# Patient Record
Sex: FEMALE | Race: WHITE | NOT HISPANIC OR LATINO | Employment: OTHER | URBAN - METROPOLITAN AREA
[De-identification: names, ages, dates, MRNs, and addresses within clinical notes are randomized per-mention and may not be internally consistent; named-entity substitution may affect disease eponyms.]

---

## 2017-06-15 DIAGNOSIS — Z12.31 SCREENING MAMMOGRAM FOR HIGH-RISK PATIENT: Primary | ICD-10-CM

## 2017-06-29 ENCOUNTER — HOSPITAL ENCOUNTER (OUTPATIENT)
Dept: RADIOLOGY | Facility: HOSPITAL | Age: 72
Discharge: HOME | End: 2017-06-29
Attending: SPECIALIST
Payer: MEDICARE

## 2017-06-29 VITALS — HEIGHT: 64 IN | BODY MASS INDEX: 31.76 KG/M2 | WEIGHT: 186 LBS

## 2017-06-29 DIAGNOSIS — Z12.31 SCREENING MAMMOGRAM FOR HIGH-RISK PATIENT: ICD-10-CM

## 2017-06-29 PROCEDURE — 77067 SCR MAMMO BI INCL CAD: CPT | Mod: 26,,, | Performed by: RADIOLOGY

## 2017-06-29 PROCEDURE — 77067 SCR MAMMO BI INCL CAD: CPT | Mod: TC

## 2017-06-29 PROCEDURE — 77063 BREAST TOMOSYNTHESIS BI: CPT | Mod: 26,,, | Performed by: RADIOLOGY

## 2018-05-15 ENCOUNTER — TELEPHONE (OUTPATIENT)
Dept: ORTHOPEDICS | Facility: CLINIC | Age: 73
End: 2018-05-15

## 2018-05-15 ENCOUNTER — OFFICE VISIT (OUTPATIENT)
Dept: ORTHOPEDICS | Facility: CLINIC | Age: 73
End: 2018-05-15
Payer: MEDICARE

## 2018-05-15 VITALS
WEIGHT: 193 LBS | DIASTOLIC BLOOD PRESSURE: 90 MMHG | BODY MASS INDEX: 32.95 KG/M2 | HEIGHT: 64 IN | SYSTOLIC BLOOD PRESSURE: 140 MMHG | HEART RATE: 80 BPM

## 2018-05-15 DIAGNOSIS — M25.332 SCAPHO-LUNATE DISSOCIATION, LEFT: ICD-10-CM

## 2018-05-15 DIAGNOSIS — R93.89 ABNORMAL X-RAY: ICD-10-CM

## 2018-05-15 DIAGNOSIS — M75.41 IMPINGEMENT SYNDROME OF RIGHT SHOULDER: Primary | ICD-10-CM

## 2018-05-15 DIAGNOSIS — M75.100 TEAR OF ROTATOR CUFF, UNSPECIFIED LATERALITY, UNSPECIFIED TEAR EXTENT: ICD-10-CM

## 2018-05-15 PROCEDURE — 20610 DRAIN/INJ JOINT/BURSA W/O US: CPT | Mod: RT,,, | Performed by: ORTHOPAEDIC SURGERY

## 2018-05-15 PROCEDURE — 99214 OFFICE O/P EST MOD 30 MIN: CPT | Mod: 25,,, | Performed by: ORTHOPAEDIC SURGERY

## 2018-05-15 RX ORDER — METHYLPREDNISOLONE ACETATE 40 MG/ML
40 INJECTION, SUSPENSION INTRA-ARTICULAR; INTRALESIONAL; INTRAMUSCULAR; SOFT TISSUE
Status: DISCONTINUED | OUTPATIENT
Start: 2018-05-15 | End: 2018-05-15 | Stop reason: HOSPADM

## 2018-05-15 RX ORDER — PANTOPRAZOLE SODIUM 40 MG/1
40 TABLET, DELAYED RELEASE ORAL DAILY
COMMUNITY
End: 2019-11-13 | Stop reason: SDUPTHER

## 2018-05-15 RX ORDER — LOSARTAN POTASSIUM 25 MG/1
25 TABLET ORAL DAILY
COMMUNITY
End: 2021-02-23 | Stop reason: SDUPTHER

## 2018-05-15 RX ADMIN — METHYLPREDNISOLONE ACETATE 40 MG: 40 INJECTION, SUSPENSION INTRA-ARTICULAR; INTRALESIONAL; INTRAMUSCULAR; SOFT TISSUE at 11:05

## 2018-05-15 NOTE — TELEPHONE ENCOUNTER
----- Message from Peng Hurst sent at 5/14/2018  8:54 AM CDT -----  Please call patient she's having issues with her arm, I scheduled her to come in to see finger for the 22nd of May, she just wants to know what can she do for the pain until then. [phone]

## 2018-05-15 NOTE — PROCEDURES
Large Joint Aspiration/Injection  Date/Time: 5/15/2018 11:45 AM  Performed by: PAULINO ANDERSON  Authorized by: PAULINO ANDERSON     Consent Done?:  Yes (Verbal)  Indications:  Pain  Procedure site marked: Yes    Timeout: Prior to procedure the correct patient, procedure, and site was verified      Location:  Shoulder  Site:  R subacromial bursa  Prep: Patient was prepped and draped in usual sterile fashion    Needle size:  25 G  Medications:  40 mg methylPREDNISolone acetate 40 mg/mL; 40 mg methylPREDNISolone acetate 40 mg/mL  Patient tolerance:  Patient tolerated the procedure well with no immediate complications

## 2018-05-15 NOTE — PROGRESS NOTES
[facility]    Subjective:     Chief Complaint:   Chief Complaint   Patient presents with    Right Shoulder - Pain     Right shoulder pain since a fall at home Thusday. X rays done at [facility]    Right Hip - Pain     Right hip pain from  Fall at home    Neck - Pain     Neck pain since fall at home       Past Medical History:   Diagnosis Date    Allergy     Anemia     Hyperlipidemia        Past Surgical History:   Procedure Laterality Date    HIP ARTHROPLASTY      HYSTERECTOMY      35 years old    TOTAL REDUCTION MAMMOPLASTY Bilateral     2000       Current Outpatient Prescriptions   Medication Sig    aspirin (ENTERIC COATED ASPIRIN) 81 MG EC tablet Every day    calcium carbonate-vit D3-min (CALTRATE 600+D PLUS MINERALS) 600 mg calcium- 400 unit Tab Twice a day    carvedilol (COREG) 6.25 MG tablet Take 6.25 mg by mouth 2 (two) times daily with meals.      coenzyme Q10 (CO Q-10) 100 mg capsule Take 100 mg by mouth once daily.      fexofenadine (ALLEGRA) 180 MG tablet Take 180 mg by mouth once daily.      losartan (COZAAR) 25 MG tablet Take 25 mg by mouth once daily.    montelukast (SINGULAIR) 10 mg tablet Every day    multivitamin-minerals-lutein (CENTRUM SILVER) Tab Every day    omega-3 acid ethyl esters (LOVAZA) 1 gram capsule Take 1 g by mouth 3 (three) times daily. Every day    pantoprazole (PROTONIX) 40 MG tablet Take 40 mg by mouth once daily.    VOLTAREN 1 % Gel USE 3 TIMES A DAY    esomeprazole (NEXIUM) 40 MG capsule Take 40 mg by mouth before breakfast.      olmesartan (BENICAR) 20 MG tablet Every day    tramadol (ULTRAM) 50 mg tablet Three times a day     No current facility-administered medications for this visit.        Review of patient's allergies indicates:   Allergen Reactions    Latex, natural rubber     Sunflower seed     Latex Rash    Naprosyn [naproxen] Rash       Family History   Problem Relation Age of Onset    Stroke Mother     Heart disease Mother     Heart  disease Father     Breast cancer Maternal Aunt        Social History     Social History    Marital status:      Spouse name: N/A    Number of children: N/A    Years of education: N/A     Occupational History    Not on file.     Social History Main Topics    Smoking status: Never Smoker    Smokeless tobacco: Not on file    Alcohol use Not on file    Drug use: No    Sexual activity: Yes     Partners: Male     Other Topics Concern    Not on file     Social History Narrative    No narrative on file       History of present illness: Patient comes in today with a chief complaint of right shoulder pain. She also has hip pain. Her biggest problem is her shoulder. She had a fall. She was seen in the emergency room.      Review of Systems:    Constitution: Negative for chills, fever, and sweats.  Negative for unexplained weight loss.    HENT:  Negative for headaches and blurry vision.    Cardiovascular:Negative for chest pain or irregular heart beat. Negative for hypertension.    Respiratory:  Negative for cough and shortness of breath.    Gastrointestinal: Negative for abdominal pain, heartburn, melena, nausea, and vomitting.    Genitourinary:  Negative bladder incontinence and dysuria.    Musculoskeletal:  See HPI for details.     Neurological: Negative for numbness.    Psychiatric/Behavioral: Negative for depression.  The patient is not nervous/anxious.      Endocrine: Negative for polyuria    Hematologic/Lymphatic: Negative for bleeding problem.  Does not bruise/bleed easily.    Skin: Negative for poor would healing and rash    Objective:      Physical Examination:    Vital Signs:    Vitals:    05/15/18 1042   BP: (!) 140/90   Pulse: 80       Body mass index is 33.13 kg/m².    This a well-developed, well nourished patient in no acute distress.  They are alert and oriented and cooperative to examination.        Patient has full range of motion of the right shoulder. Her rotator cuff is profoundly weak.  Spurling sign is negative. Full range of motion of the left shoulder. Full range of motion of the cervical spine. She has no pain with internal and external rotation of her right hip. She has a contusion right hip  Pertinent New Results:    XRAY Report / Interpretation:   AP and lateral of the right shoulder demonstrates a type II acromion no fractures or subluxations  AP of the pelvis demonstrates no fractures or subluxations. She is noted to have right retained anchor is in the trochanteric region. No osteoarthritis    Assessment/Plan:      Right shoulder rotator cuff tear. I injected with Depo-Medrol and lidocaine. She had some relief of her pain but she remained profoundly weak. I've ordered an MRI scan. In terms of her hip she has a right hip contusion. No intervention. She will follow-up with her MRI      This note was created using Dragon voice recognition software that occasionally misinterpreted phrases or words.

## 2018-05-24 ENCOUNTER — OFFICE VISIT (OUTPATIENT)
Dept: ORTHOPEDICS | Facility: CLINIC | Age: 73
End: 2018-05-24
Payer: MEDICARE

## 2018-05-24 VITALS
HEIGHT: 64 IN | SYSTOLIC BLOOD PRESSURE: 138 MMHG | DIASTOLIC BLOOD PRESSURE: 80 MMHG | BODY MASS INDEX: 32.61 KG/M2 | WEIGHT: 191 LBS

## 2018-05-24 DIAGNOSIS — M75.111 INCOMPLETE TEAR OF RIGHT ROTATOR CUFF: Primary | ICD-10-CM

## 2018-05-24 DIAGNOSIS — S43.431A SUPERIOR GLENOID LABRUM LESION OF RIGHT SHOULDER, INITIAL ENCOUNTER: ICD-10-CM

## 2018-05-24 DIAGNOSIS — M75.41 IMPINGEMENT SYNDROME OF RIGHT SHOULDER: ICD-10-CM

## 2018-05-24 PROCEDURE — 99213 OFFICE O/P EST LOW 20 MIN: CPT | Mod: ,,, | Performed by: ORTHOPAEDIC SURGERY

## 2018-05-24 NOTE — PROGRESS NOTES
[facility]    Subjective:     Chief Complaint:   Chief Complaint   Patient presents with    Right Shoulder - Injury     Right shoulder MRI results       Past Medical History:   Diagnosis Date    Allergy     Anemia     Hyperlipidemia        Past Surgical History:   Procedure Laterality Date    HIP ARTHROPLASTY      HYSTERECTOMY      35 years old    TOTAL REDUCTION MAMMOPLASTY Bilateral     2000       Current Outpatient Prescriptions   Medication Sig    aspirin (ENTERIC COATED ASPIRIN) 81 MG EC tablet Every day    calcium carbonate-vit D3-min (CALTRATE 600+D PLUS MINERALS) 600 mg calcium- 400 unit Tab Twice a day    carvedilol (COREG) 6.25 MG tablet Take 6.25 mg by mouth 2 (two) times daily with meals.      coenzyme Q10 (CO Q-10) 100 mg capsule Take 100 mg by mouth once daily.      losartan (COZAAR) 25 MG tablet Take 25 mg by mouth once daily.    montelukast (SINGULAIR) 10 mg tablet Every day    multivitamin-minerals-lutein (CENTRUM SILVER) Tab Every day    pantoprazole (PROTONIX) 40 MG tablet Take 40 mg by mouth once daily.     No current facility-administered medications for this visit.        Review of patient's allergies indicates:   Allergen Reactions    Latex, natural rubber     Sunflower seed     Latex Rash    Naprosyn [naproxen] Rash       Family History   Problem Relation Age of Onset    Stroke Mother     Heart disease Mother     Heart disease Father     Breast cancer Maternal Aunt        Social History     Social History    Marital status:      Spouse name: N/A    Number of children: N/A    Years of education: N/A     Occupational History    Not on file.     Social History Main Topics    Smoking status: Never Smoker    Smokeless tobacco: Not on file    Alcohol use Not on file    Drug use: No    Sexual activity: Yes     Partners: Male     Other Topics Concern    Not on file     Social History Narrative    No narrative on file       History of present  illness: Patient returns for continued evaluation of the right shoulder. She continues complain of significant shoulder pain and weakness.      Review of Systems:    Constitution: Negative for chills, fever, and sweats.  Negative for unexplained weight loss.    HENT:  Negative for headaches and blurry vision.    Cardiovascular:Negative for chest pain or irregular heart beat. Negative for hypertension.    Respiratory:  Negative for cough and shortness of breath.    Gastrointestinal: Negative for abdominal pain, heartburn, melena, nausea, and vomitting.    Genitourinary:  Negative bladder incontinence and dysuria.    Musculoskeletal:  See HPI for details.     Neurological: Negative for numbness.    Psychiatric/Behavioral: Negative for depression.  The patient is not nervous/anxious.      Endocrine: Negative for polyuria    Hematologic/Lymphatic: Negative for bleeding problem.  Does not bruise/bleed easily.    Skin: Negative for poor would healing and rash    Objective:      Physical Examination:    Vital Signs:    Vitals:    05/24/18 1432   BP: 138/80       Body mass index is 32.79 kg/m².    This a well-developed, well nourished patient in no acute distress.  They are alert and oriented and cooperative to examination.        Patient has full range of motion of the shoulder passively. Her rotator cuff is profoundly weak. Spurling sign is negative. Full range of motion of the left shoulder  Pertinent New Results:    XRAY Report / Interpretation:   MRI done at [facility] demonstrates partial-thickness tearing of the rotator cuff, SLAP tear, subacromial impingement.    Assessment/Plan:      Impingement syndrome partial-thickness tearing of the rotator cuff. I've started her on physical therapy. I will check her back in a month      This note was created using Dragon voice recognition software that occasionally misinterpreted phrases or words.

## 2018-06-08 ENCOUNTER — OFFICE VISIT (OUTPATIENT)
Dept: ORTHOPEDICS | Facility: CLINIC | Age: 73
End: 2018-06-08
Payer: MEDICARE

## 2018-06-08 VITALS
BODY MASS INDEX: 32.61 KG/M2 | HEIGHT: 64 IN | HEART RATE: 79 BPM | WEIGHT: 191 LBS | DIASTOLIC BLOOD PRESSURE: 76 MMHG | SYSTOLIC BLOOD PRESSURE: 138 MMHG

## 2018-06-08 DIAGNOSIS — Z98.1 HISTORY OF FUSION OF CERVICAL SPINE: ICD-10-CM

## 2018-06-08 DIAGNOSIS — M70.62 GREATER TROCHANTERIC BURSITIS OF LEFT HIP: Primary | ICD-10-CM

## 2018-06-08 DIAGNOSIS — M54.50 LOW BACK PAIN, NON-SPECIFIC: ICD-10-CM

## 2018-06-08 DIAGNOSIS — M25.552 LEFT HIP PAIN: ICD-10-CM

## 2018-06-08 DIAGNOSIS — M54.12 CERVICAL RADICULITIS: ICD-10-CM

## 2018-06-08 DIAGNOSIS — M43.16 SPONDYLOLISTHESIS OF LUMBAR REGION: ICD-10-CM

## 2018-06-08 PROCEDURE — 99213 OFFICE O/P EST LOW 20 MIN: CPT | Mod: 25,,, | Performed by: PHYSICIAN ASSISTANT

## 2018-06-08 PROCEDURE — 72100 X-RAY EXAM L-S SPINE 2/3 VWS: CPT | Mod: ,,, | Performed by: PHYSICIAN ASSISTANT

## 2018-06-08 PROCEDURE — 73502 X-RAY EXAM HIP UNI 2-3 VIEWS: CPT | Mod: LT,,, | Performed by: PHYSICIAN ASSISTANT

## 2018-06-08 PROCEDURE — 20610 DRAIN/INJ JOINT/BURSA W/O US: CPT | Mod: LT,,, | Performed by: PHYSICIAN ASSISTANT

## 2018-06-08 RX ORDER — METHYLPREDNISOLONE ACETATE 40 MG/ML
40 INJECTION, SUSPENSION INTRA-ARTICULAR; INTRALESIONAL; INTRAMUSCULAR; SOFT TISSUE
Status: DISCONTINUED | OUTPATIENT
Start: 2018-06-08 | End: 2018-06-08 | Stop reason: HOSPADM

## 2018-06-08 RX ADMIN — METHYLPREDNISOLONE ACETATE 40 MG: 40 INJECTION, SUSPENSION INTRA-ARTICULAR; INTRALESIONAL; INTRAMUSCULAR; SOFT TISSUE at 12:06

## 2018-06-08 NOTE — PROCEDURES
Large Joint Aspiration/Injection  Date/Time: 6/8/2018 12:45 PM  Performed by: MYKEL BETTENCOURT  Authorized by: MYKEL BETTENCOURT     Consent Done?:  Yes (Verbal)  Indications:  Pain  Procedure site marked: Yes    Timeout: Prior to procedure the correct patient, procedure, and site was verified      Location:  Hip  Site:  L greater trochanteric bursa  Prep: Patient was prepped and draped in usual sterile fashion    Ultrasonic Guidance for needle placement: No  Needle size:  22 G  Approach:  Lateral  Medications:  40 mg methylPREDNISolone acetate 40 mg/mL; 40 mg methylPREDNISolone acetate 40 mg/mL  Patient tolerance:  Patient tolerated the procedure well with no immediate complications

## 2018-06-08 NOTE — PROGRESS NOTES
Subjective:       Patient ID: Sarina Zavala is a 72 y.o. female.    Chief Complaint: Pain of the Lower Back (Lumbar pain x 2 months off and on. States pain does radiate down left side to hip region. ); Pain of the Left Hip (Left hip pain x 3 weeks. States pt did fall. States it does hurt to walk and that she is not able to bear weight correctly. She limps on her left side. Patient requests an injection to help with pain. ); and Pain of the Left Wrist (Left wrist pain x a few months. Pain starts in wrist and does radiate up. States that the wrist hurts when she moves in certain directions. )      History of Present Illness  Patient presents with multiple orthopedic complaints. Her chief complaint today is left lateral hip pain over the greater trochanter. Besides that she has some left wrist pain as well as some left lumbar pain with radiculitis. The fall landing on her right side and has had some issues on her right side as well. Dr. finger is following her for these issues. Dr. hutton has also recommended that she follow-up with Dr. Sood for the lumbar spine radicular symptoms. The patient has an appointment scheduled later this month. The patient has a past medical history of lumbar degenerative disc disease as well as cervical degenerative disc disease with a previous cervical fusion. She has been complaining of left upper extremity radicular symptoms as well.    Current Medications  Current Outpatient Prescriptions   Medication Sig Dispense Refill    aspirin (ENTERIC COATED ASPIRIN) 81 MG EC tablet Every day      calcium carbonate-vit D3-min (CALTRATE 600+D PLUS MINERALS) 600 mg calcium- 400 unit Tab Twice a day      carvedilol (COREG) 6.25 MG tablet Take 6.25 mg by mouth 2 (two) times daily with meals.        coenzyme Q10 (CO Q-10) 100 mg capsule Take 100 mg by mouth once daily.        losartan (COZAAR) 25 MG tablet Take 25 mg by mouth once daily.      montelukast (SINGULAIR) 10 mg tablet  Every day      multivitamin-minerals-lutein (CENTRUM SILVER) Tab Every day      pantoprazole (PROTONIX) 40 MG tablet Take 40 mg by mouth once daily.       No current facility-administered medications for this visit.        Allergies  Review of patient's allergies indicates:   Allergen Reactions    Latex, natural rubber     Sunflower seed     Latex Rash    Naprosyn [naproxen] Rash       Past Medical History  Past Medical History:   Diagnosis Date    Allergy     Anemia     Hyperlipidemia        Surgical History  Past Surgical History:   Procedure Laterality Date    HIP ARTHROPLASTY      HYSTERECTOMY      35 years old    TOTAL REDUCTION MAMMOPLASTY Bilateral     2000       Family History:   Family History   Problem Relation Age of Onset    Stroke Mother     Heart disease Mother     Heart disease Father     Breast cancer Maternal Aunt        Social History:   Social History     Social History    Marital status:      Spouse name: N/A    Number of children: N/A    Years of education: N/A     Occupational History    Not on file.     Social History Main Topics    Smoking status: Never Smoker    Smokeless tobacco: Not on file    Alcohol use Not on file    Drug use: No    Sexual activity: Yes     Partners: Male     Other Topics Concern    Not on file     Social History Narrative    No narrative on file       Hospitalization/Major Diagnostic Procedure:     Review of Systems     General/Constitutional:  Chills denies. Fatigue denies. Fever denies. Weight gain denies. Weight loss denies.    Respiratory:  Shortness of breath denies.    Cardiovascular:  Chest pain denies.    Gastrointestinal:  Constipation denies. Diarrhea denies. Nausea denies. Vomiting denies.     Hematology:  Easy bruising denies. Prolonged bleeding denies.     Genitourinary:  Frequent urination denies. Pain in lower back denies. Painful urination denies.     Musculoskeletal:  See HPI for details    Skin:  Rash  denies.    Neurologic:  Dizziness denies. Gait abnormalities denies. Seizures denies. Tingling/Numbess denies.    Psychiatric:  Anxiety denies. Depressed mood denies.     Objective:   Vital Signs:   Vitals:    06/08/18 1138   BP: 138/76   Pulse: 79        Physical Exam      General Examination:     Constitutional: The patient is alert and oriented to lace person and time. Mood is pleasant.     Head/Face: Normal facial features normal eyebrows    Eyes: Normal extraocular motion bilaterally    Lungs: Respirations are equal and unlabored    Gait is coordinated.    Cardiovascular: There are no swelling or varicosities present.    Lymphatic: Negative for adenopathy    Skin: Normal    Neurological: Level of consciousness normal. Oriented to place person and time and situation    Psychiatric: Oriented to time place person and situation    Left wrist was not examined.    Lumbar spine exam. Antalgic gait with decreased weightbearing on the left lower extremity. Exquisite tenderness palpation over the left hip greater trochanter. Lumbar range of motion diminished in all planes. Bilateral lower extremities are distal neurovascular intact. Cervical exam: Anterior cervical incision remains well-healed. Range of motion diminished in all planes. Diffuse tenderness palpation of the bilateral sternocleidomastoid and upper trapezius muscles. Left upper extremity exam demonstrates giveaway weakness secondary to pain. No true focal neurological weakness demonstrated.    XRAY Report/ Interpretation: Lumbar AP and lateral x-rays taken in the office today and reviewed the patient and her  demonstrates L2-3 grade 1 spondylolisthesis. Advanced multilevel lumbar degenerative disc disease and facet sclerosis. Left hip x-rays taken in the office today demonstrate no significant abnormality; AP and lateral x-rays were done of the hip.    Cervical x-rays and left wrist x-rays were not done today      Assessment:       1. Left hip pain     2. Low back pain, non-specific    3. Spondylolisthesis of lumbar region    4. Cervical radiculitis    5. History of fusion of cervical spine    6. Greater trochanteric bursitis of left hip        Plan:       Sarina was seen today for pain, pain and pain.    Diagnoses and all orders for this visit:    Left hip pain  -     X-Ray Hip 2 or 3 views Left    Low back pain, non-specific  -     X-Ray Lumbar Spine Ap And Lateral    Spondylolisthesis of lumbar region  -     MRI Lumbar Spine Without Contrast    Cervical radiculitis  -     MRI Cervical Spine Without Contrast    History of fusion of cervical spine  -     MRI Lumbar Spine Without Contrast    Greater trochanteric bursitis of left hip         No Follow-up on file.    Today she was given a left hip greater trochanter bursa injection using 2 cc lidocaine and 80 mg Depo-Medrol. Please see procedure report for details. I am going to order an updated cervical and lumbar MRI. The patient is to follow-up as scheduled in the next few weeks. At that time we will review the updated MRIs and discuss further treatment options for her cervical spine and her lumbar spine    This note was created using Dragon voice recognition software that occasionally misinterpreted phrases or words.

## 2018-06-28 ENCOUNTER — OFFICE VISIT (OUTPATIENT)
Dept: ORTHOPEDICS | Facility: CLINIC | Age: 73
End: 2018-06-28
Payer: MEDICARE

## 2018-06-28 VITALS
BODY MASS INDEX: 32.44 KG/M2 | WEIGHT: 190 LBS | HEIGHT: 64 IN | HEART RATE: 85 BPM | SYSTOLIC BLOOD PRESSURE: 125 MMHG | DIASTOLIC BLOOD PRESSURE: 65 MMHG

## 2018-06-28 DIAGNOSIS — M75.41 IMPINGEMENT SYNDROME OF RIGHT SHOULDER: ICD-10-CM

## 2018-06-28 DIAGNOSIS — M75.111 INCOMPLETE TEAR OF RIGHT ROTATOR CUFF: Primary | ICD-10-CM

## 2018-06-28 DIAGNOSIS — S43.431A SUPERIOR GLENOID LABRUM LESION OF RIGHT SHOULDER, INITIAL ENCOUNTER: ICD-10-CM

## 2018-06-28 PROCEDURE — 99213 OFFICE O/P EST LOW 20 MIN: CPT | Mod: ,,, | Performed by: ORTHOPAEDIC SURGERY

## 2018-06-28 RX ORDER — EZETIMIBE 10 MG/1
TABLET ORAL
Refills: 4 | COMMUNITY
Start: 2018-06-12 | End: 2018-10-30

## 2018-06-28 NOTE — PROGRESS NOTES
[facility]    Subjective:     Chief Complaint:   Chief Complaint   Patient presents with    Right Shoulder - Pain     Right shoulder PT follow up. States PT is helping greatly. States pain and ROM is much better. States her shoulder feels great and only a few motions aggravates it.        Past Medical History:   Diagnosis Date    Allergy     Anemia     Hyperlipidemia        Past Surgical History:   Procedure Laterality Date    HIP ARTHROPLASTY      HYSTERECTOMY      35 years old    TOTAL REDUCTION MAMMOPLASTY Bilateral     2000       Current Outpatient Prescriptions   Medication Sig    aspirin (ENTERIC COATED ASPIRIN) 81 MG EC tablet Every day    calcium carbonate-vit D3-min (CALTRATE 600+D PLUS MINERALS) 600 mg calcium- 400 unit Tab Twice a day    carvedilol (COREG) 6.25 MG tablet Take 6.25 mg by mouth 2 (two) times daily with meals.      coenzyme Q10 (CO Q-10) 100 mg capsule Take 100 mg by mouth once daily.      ezetimibe (ZETIA) 10 mg tablet TK 1 T PO QD    losartan (COZAAR) 25 MG tablet Take 25 mg by mouth once daily.    montelukast (SINGULAIR) 10 mg tablet Every day    multivitamin-minerals-lutein (CENTRUM SILVER) Tab Every day    pantoprazole (PROTONIX) 40 MG tablet Take 40 mg by mouth once daily.     No current facility-administered medications for this visit.        Review of patient's allergies indicates:   Allergen Reactions    Latex, natural rubber     Sunflower seed     Latex Rash    Naprosyn [naproxen] Rash       Family History   Problem Relation Age of Onset    Stroke Mother     Heart disease Mother     Heart disease Father     Breast cancer Maternal Aunt        Social History     Social History    Marital status:      Spouse name: N/A    Number of children: N/A    Years of education: N/A     Occupational History    Not on file.     Social History Main Topics    Smoking status: Never Smoker    Smokeless tobacco: Not on file    Alcohol use Not on file     Drug use: No    Sexual activity: Yes     Partners: Male     Other Topics Concern    Not on file     Social History Narrative    No narrative on file       History of present illness: Patient comes in today for her right shoulder. She is generally doing better. Having mild discomfort but much better than she was.      Review of Systems:    Constitution: Negative for chills, fever, and sweats.  Negative for unexplained weight loss.    HENT:  Negative for headaches and blurry vision.    Cardiovascular:Negative for chest pain or irregular heart beat. Negative for hypertension.    Respiratory:  Negative for cough and shortness of breath.    Gastrointestinal: Negative for abdominal pain, heartburn, melena, nausea, and vomitting.    Genitourinary:  Negative bladder incontinence and dysuria.    Musculoskeletal:  See HPI for details.     Neurological: Negative for numbness.    Psychiatric/Behavioral: Negative for depression.  The patient is not nervous/anxious.      Endocrine: Negative for polyuria    Hematologic/Lymphatic: Negative for bleeding problem.  Does not bruise/bleed easily.    Skin: Negative for poor would healing and rash    Objective:      Physical Examination:    Vital Signs:    Vitals:    06/28/18 1354   BP: 125/65   Pulse: 85       Body mass index is 32.61 kg/m².    This a well-developed, well nourished patient in no acute distress.  They are alert and oriented and cooperative to examination.        Patient has full range of motion of the right shoulder. Mild impingement. Her rotator cuff is grossly intact. Crossover is negative. Full range of motion of the left shoulder. Spurling sign is negative.  Pertinent New Results:    XRAY Report / Interpretation:   No new XRAYS Today.    Assessment/Plan:      Impingement syndrome doing well with Depo-Medrol and strengthening. She will now transition to her own patient directed strengthening program. She will follow-up when necessary      This note was created  using Dragon voice recognition software that occasionally misinterpreted phrases or words.

## 2018-07-02 ENCOUNTER — OFFICE VISIT (OUTPATIENT)
Dept: ORTHOPEDICS | Facility: CLINIC | Age: 73
End: 2018-07-02
Payer: MEDICARE

## 2018-07-02 VITALS
SYSTOLIC BLOOD PRESSURE: 130 MMHG | DIASTOLIC BLOOD PRESSURE: 78 MMHG | BODY MASS INDEX: 32.44 KG/M2 | HEART RATE: 78 BPM | WEIGHT: 190 LBS | HEIGHT: 64 IN

## 2018-07-02 DIAGNOSIS — M48.061 LUMBAR FORAMINAL STENOSIS: ICD-10-CM

## 2018-07-02 DIAGNOSIS — M54.50 LOW BACK PAIN, NON-SPECIFIC: Primary | ICD-10-CM

## 2018-07-02 DIAGNOSIS — M43.16 SPONDYLOLISTHESIS OF LUMBAR REGION: ICD-10-CM

## 2018-07-02 DIAGNOSIS — M25.552 LEFT HIP PAIN: ICD-10-CM

## 2018-07-02 DIAGNOSIS — M50.90 CERVICAL DISC DISEASE: ICD-10-CM

## 2018-07-02 DIAGNOSIS — M47.812 ARTHROPATHY OF CERVICAL FACET JOINT: ICD-10-CM

## 2018-07-02 DIAGNOSIS — M70.62 GREATER TROCHANTERIC BURSITIS OF LEFT HIP: ICD-10-CM

## 2018-07-02 DIAGNOSIS — M54.12 RADICULITIS OF LEFT CERVICAL REGION: ICD-10-CM

## 2018-07-02 DIAGNOSIS — Z98.1 HISTORY OF FUSION OF CERVICAL SPINE: ICD-10-CM

## 2018-07-02 DIAGNOSIS — M51.36 LUMBAR DEGENERATIVE DISC DISEASE: ICD-10-CM

## 2018-07-02 PROCEDURE — 99214 OFFICE O/P EST MOD 30 MIN: CPT | Mod: ,,, | Performed by: ORTHOPAEDIC SURGERY

## 2018-07-02 NOTE — PROGRESS NOTES
Subjective:       Patient ID: Sarina Zavala is a 72 y.o. female.    Chief Complaint: Pain of the Lower Back (Lumbar MRI results. States her back is sore.) and Pain of the Neck (Cervical MRI results. )      History of Present Illness  Patient is here to follow-up for multiple orthopedic issues. Specifically today she is here to follow-up regarding her low back and her cervical spine with updated MRIs of each. She continues to have neck pain and parascapular pain with left upper extremity radiculitis and dysesthesia as well as bilateral low back pain with bilateral lower extremity radiculitis and dysesthesia worse on the left than on the right. She has an updated lumbar and cervical MRI to review today.    Current Medications  Current Outpatient Prescriptions   Medication Sig Dispense Refill    aspirin (ENTERIC COATED ASPIRIN) 81 MG EC tablet Every day      calcium carbonate-vit D3-min (CALTRATE 600+D PLUS MINERALS) 600 mg calcium- 400 unit Tab Twice a day      carvedilol (COREG) 6.25 MG tablet Take 6.25 mg by mouth 2 (two) times daily with meals.        coenzyme Q10 (CO Q-10) 100 mg capsule Take 100 mg by mouth once daily.        ezetimibe (ZETIA) 10 mg tablet TK 1 T PO QD  4    losartan (COZAAR) 25 MG tablet Take 25 mg by mouth once daily.      montelukast (SINGULAIR) 10 mg tablet Every day      multivitamin-minerals-lutein (CENTRUM SILVER) Tab Every day      pantoprazole (PROTONIX) 40 MG tablet Take 40 mg by mouth once daily.       No current facility-administered medications for this visit.        Allergies  Review of patient's allergies indicates:   Allergen Reactions    Latex, natural rubber     Sunflower seed     Latex Rash    Naprosyn [naproxen] Rash       Past Medical History  Past Medical History:   Diagnosis Date    Allergy     Anemia     Hyperlipidemia        Surgical History  Past Surgical History:   Procedure Laterality Date    HIP ARTHROPLASTY      HYSTERECTOMY      35  years old    TOTAL REDUCTION MAMMOPLASTY Bilateral     2000       Family History:   Family History   Problem Relation Age of Onset    Stroke Mother     Heart disease Mother     Heart disease Father     Breast cancer Maternal Aunt        Social History:   Social History     Social History    Marital status:      Spouse name: N/A    Number of children: N/A    Years of education: N/A     Occupational History    Not on file.     Social History Main Topics    Smoking status: Never Smoker    Smokeless tobacco: Not on file    Alcohol use Not on file    Drug use: No    Sexual activity: Yes     Partners: Male     Other Topics Concern    Not on file     Social History Narrative    No narrative on file       Hospitalization/Major Diagnostic Procedure:     Review of Systems     General/Constitutional:  Chills denies. Fatigue denies. Fever denies. Weight gain denies. Weight loss denies.    Respiratory:  Shortness of breath denies.    Cardiovascular:  Chest pain denies.    Gastrointestinal:  Constipation denies. Diarrhea denies. Nausea denies. Vomiting denies.     Hematology:  Easy bruising denies. Prolonged bleeding denies.     Genitourinary:  Frequent urination denies. Pain in lower back denies. Painful urination denies.     Musculoskeletal:  See HPI for details    Skin:  Rash denies.    Neurologic:  Dizziness denies. Gait abnormalities denies. Seizures denies. Tingling/Numbess denies.    Psychiatric:  Anxiety denies. Depressed mood denies.     Objective:   Vital Signs:   Vitals:    07/02/18 1533   BP: 130/78   Pulse: 78        Physical Exam      General Examination:     Constitutional: The patient is alert and oriented to lace person and time. Mood is pleasant.     Head/Face: Normal facial features normal eyebrows    Eyes: Normal extraocular motion bilaterally    Lungs: Respirations are equal and unlabored    Gait is coordinated.    Cardiovascular: There are no swelling or varicosities  present.    Lymphatic: Negative for adenopathy    Skin: Normal    Neurological: Level of consciousness normal. Oriented to place person and time and situation    Psychiatric: Oriented to time place person and situation    Lumbar exam: Mild antalgic gait. Tenderness palpation of the left hip greater trochanter. Lumbar range of motion diminished in all planes secondary to pain and guarding. Left lower extremity exam demonstrate full active range of motion, equal and symmetric DTRs, and normal strength. Cervical exam demonstrates diminished range of motion in all planes mobility. Left upper trapezius and sternocleidomastoid tenderness palpation. Bilateral upper extremity is demonstrate full active range of motion, equal and symmetric DTRs, and normal strength with a negative Ty's.    XRAY Report/ Interpretation:  Lumbar MRI reviewed the patient in the office today demonstrates multilevel mild to moderate degenerative disc disease and facet arthropathy causing some foraminal stenosis on the left at L2-3 and L4-5. There is little to no disc space left at L5-S1.    Cervical MRI reviewed with the patient in the office today. She is status post a previous C5-6 fusion which is solid. Otherwise she has multilevel degenerative disc disease and facet arthropathy with varying degrees of both left and right foraminal stenosis at different levels.      Assessment:       1. Low back pain, non-specific    2. Left hip pain    3. Spondylolisthesis of lumbar region    4. Lumbar foraminal stenosis    5. Lumbar degenerative disc disease    6. Greater trochanteric bursitis of left hip    7. Cervical disc disease    8. History of fusion of cervical spine    9. Arthropathy of cervical facet joint    10. Radiculitis of left cervical region        Plan:       Sarina was seen today for pain and pain.    Diagnoses and all orders for this visit:    Low back pain, non-specific    Left hip pain    Spondylolisthesis of lumbar  region    Lumbar foraminal stenosis    Lumbar degenerative disc disease    Greater trochanteric bursitis of left hip    Cervical disc disease    History of fusion of cervical spine    Arthropathy of cervical facet joint    Radiculitis of left cervical region         Follow-up in about 6 weeks (around 8/13/2018).    At this time we are going to refer her to Dr. Jd Johnston to evaluate and treat her cervical and lumbar spine with epidural type injections. We are also going to refer her to physical therapy to assess and work on her gait and hopefully eliminate her left lateral hip pain.    I would like to see her back in 6 weeks to reassess her.  This note was created using Dragon voice recognition software that occasionally misinterpreted phrases or words.

## 2018-07-30 DIAGNOSIS — Z12.31 VISIT FOR SCREENING MAMMOGRAM: Primary | ICD-10-CM

## 2018-08-07 ENCOUNTER — HOSPITAL ENCOUNTER (OUTPATIENT)
Dept: RADIOLOGY | Facility: HOSPITAL | Age: 73
Discharge: HOME | End: 2018-08-07
Attending: SPECIALIST
Payer: MEDICARE

## 2018-08-07 DIAGNOSIS — Z12.31 VISIT FOR SCREENING MAMMOGRAM: ICD-10-CM

## 2018-08-07 PROCEDURE — 77067 SCR MAMMO BI INCL CAD: CPT | Mod: TC

## 2018-08-07 PROCEDURE — 77063 BREAST TOMOSYNTHESIS BI: CPT | Mod: 26,,, | Performed by: RADIOLOGY

## 2018-08-07 PROCEDURE — 77067 SCR MAMMO BI INCL CAD: CPT | Mod: 26,,, | Performed by: RADIOLOGY

## 2018-08-15 ENCOUNTER — HOSPITAL ENCOUNTER (OUTPATIENT)
Dept: RADIOLOGY | Facility: HOSPITAL | Age: 73
Discharge: HOME | End: 2018-08-15
Attending: SPECIALIST
Payer: MEDICARE

## 2018-08-15 DIAGNOSIS — R92.8 ABNORMAL MAMMOGRAM: ICD-10-CM

## 2018-08-15 PROCEDURE — 77065 DX MAMMO INCL CAD UNI: CPT | Mod: TC,PO,LT

## 2018-08-15 PROCEDURE — 77061 BREAST TOMOSYNTHESIS UNI: CPT | Mod: TC,PO,LT

## 2018-08-15 PROCEDURE — 77061 BREAST TOMOSYNTHESIS UNI: CPT | Mod: 26,,, | Performed by: RADIOLOGY

## 2018-08-15 PROCEDURE — 77065 DX MAMMO INCL CAD UNI: CPT | Mod: 26,LT,, | Performed by: RADIOLOGY

## 2018-10-30 ENCOUNTER — HOSPITAL ENCOUNTER (OUTPATIENT)
Dept: RADIOLOGY | Facility: HOSPITAL | Age: 73
Discharge: HOME | End: 2018-10-30
Attending: ORTHOPAEDIC SURGERY
Payer: MEDICARE

## 2018-10-30 ENCOUNTER — OFFICE VISIT (OUTPATIENT)
Dept: ORTHOPEDICS | Facility: CLINIC | Age: 73
End: 2018-10-30
Payer: MEDICARE

## 2018-10-30 VITALS
DIASTOLIC BLOOD PRESSURE: 67 MMHG | BODY MASS INDEX: 32.44 KG/M2 | SYSTOLIC BLOOD PRESSURE: 122 MMHG | HEART RATE: 98 BPM | HEIGHT: 64 IN | WEIGHT: 190 LBS

## 2018-10-30 DIAGNOSIS — M79.672 BILATERAL FOOT PAIN: Primary | ICD-10-CM

## 2018-10-30 DIAGNOSIS — M79.671 BILATERAL FOOT PAIN: Primary | ICD-10-CM

## 2018-10-30 DIAGNOSIS — M76.821 POSTERIOR TIBIAL TENDONITIS, RIGHT: Primary | ICD-10-CM

## 2018-10-30 DIAGNOSIS — M79.671 BILATERAL FOOT PAIN: ICD-10-CM

## 2018-10-30 DIAGNOSIS — M79.672 BILATERAL FOOT PAIN: ICD-10-CM

## 2018-10-30 PROCEDURE — 99213 OFFICE O/P EST LOW 20 MIN: CPT | Mod: PBBFAC,25,PN | Performed by: ORTHOPAEDIC SURGERY

## 2018-10-30 PROCEDURE — 73630 X-RAY EXAM OF FOOT: CPT | Mod: 26,50,, | Performed by: RADIOLOGY

## 2018-10-30 PROCEDURE — 99999 PR PBB SHADOW E&M-EST. PATIENT-LVL III: CPT | Mod: PBBFAC,,, | Performed by: ORTHOPAEDIC SURGERY

## 2018-10-30 PROCEDURE — 20550 NJX 1 TENDON SHEATH/LIGAMENT: CPT | Mod: PBBFAC,PN | Performed by: ORTHOPAEDIC SURGERY

## 2018-10-30 PROCEDURE — 99204 OFFICE O/P NEW MOD 45 MIN: CPT | Mod: 25,S$PBB,, | Performed by: ORTHOPAEDIC SURGERY

## 2018-10-30 PROCEDURE — 73630 X-RAY EXAM OF FOOT: CPT | Mod: 50,TC,PO

## 2018-10-30 RX ORDER — AMOXICILLIN 500 MG
1 CAPSULE ORAL 2 TIMES DAILY
COMMUNITY
End: 2019-11-13

## 2018-10-30 RX ORDER — MELOXICAM 15 MG/1
15 TABLET ORAL DAILY
Qty: 30 TABLET | Refills: 3 | Status: SHIPPED | OUTPATIENT
Start: 2018-10-30 | End: 2019-11-13

## 2018-10-30 RX ADMIN — TRIAMCINOLONE ACETONIDE 40 MG: 40 INJECTION, SUSPENSION INTRA-ARTICULAR; INTRAMUSCULAR at 04:10

## 2018-10-30 NOTE — PROGRESS NOTES
"HPI: Sarina Zavala is a 73 y.o. female who complains of bilateral foot pain in her arches. She rates her pain as 4/10 today. She says the pain began about a month ago and has been worsening.  The right is more painful than the left. She has h/o gout. She says it hurts to turn the foot in. Pt denies weakness, numbness, and tingling.  No history of injury or trauma.       PAST MEDICAL/SURGICAL/FAMILY/SOCIAL/ HISTORY: REVIEWED    ALLERGIES/MEDICATIONS: REVIEWED       Review of Systems:     Constitution: Negative.   HEENT: Negative.   Eyes: Negative.   Cardiovascular: Negative.   Respiratory: Negative.   Endocrine: Negative.   Hematologic/Lymphatic: Negative.   Skin: Negative.   Musculoskeletal: Positive for bilateral foot pain   Gastrointestinal: Negative.   Genitourinary: Negative.   Neurological: Negative.   Psychiatric/Behavioral: Negative.   Allergic/Immunologic: Negative.       PHYSICAL EXAM:  Vitals:    10/30/18 1542   BP: 122/67   Pulse: 98     Ht Readings from Last 1 Encounters:   10/30/18 5' 4" (1.626 m)     Wt Readings from Last 1 Encounters:   10/30/18 86.2 kg (190 lb)         GENERAL: Well developed, well nourished, no acute distress.  SKIN: Skin is intact. No atrophy, abrasions or lesions are noted.   Neurological: Normal mental status. Appropriate and conversant. Alert and oriented x 3.  GAIT: Walks with an antalgic gait.    Bilateral lower extremities:  2+ dorsalis pedis pulse.  Capillary refill < 3 seconds. Decreased range of motion tibiotalar and subtalar joints. pes planovalgus R>>L.  5/5 strength EHL, FHL, tibialis anterior, gastrocsoleus, tibialis posterior and peroneals. Sensation to light touch intact sural, saphenous, superficial peroneal and deep peroneal nerves. Mild to moderate swelling right foot, no ecchymosis.  No lymphadenopathy, no masses or tumors palpated.   tenderness to palpation at the posterior tibial tendon insertion. She is able to perform a single heel raise. "       XRAYS:   3 views of bilateral feet obtained and reviewed today reveal pes planovalgus, mild degenerative changes.       ASSESSMENT:   Medications Ordered This Encounter   Medications    meloxicam (MOBIC) 15 MG tablet       Encounter Diagnosis   Name Primary?    Posterior tibial tendonitis, right Yes        PLAN:  I spent 20 minutes in consulation with the patient today. More than half the time was spent counseling the patient on her condition and the options for operative versus non-operative care.  Ankle sleeve given. Handout on posterior tibial tendonitis and 3/4 inch inserts given. Return to clinic if symptoms persist.

## 2018-10-30 NOTE — PROCEDURES
Tendon Sheath  Date/Time: 10/30/2018 4:28 PM  Performed by: Ibrahima Kelly MD  Authorized by: Ibrahima Kelly MD     Consent Done?:  Yes (Verbal)  Indications:  Pain  Site marked: the procedure site was marked    Location:  Foot  Foot joint: right posterior tibial tendon sheath.  Prep: patient was prepped and draped in usual sterile fashion    Needle size:  22 G  Approach:  Medial  Medications:  40 mg triamcinolone acetonide 40 mg/mL  Patient tolerance:  Patient tolerated the procedure well with no immediate complications

## 2018-11-06 RX ORDER — TRIAMCINOLONE ACETONIDE 40 MG/ML
40 INJECTION, SUSPENSION INTRA-ARTICULAR; INTRAMUSCULAR
Status: DISCONTINUED | OUTPATIENT
Start: 2018-10-30 | End: 2018-11-06 | Stop reason: HOSPADM

## 2019-02-14 ENCOUNTER — TELEPHONE (OUTPATIENT)
Dept: ORTHOPEDICS | Facility: CLINIC | Age: 74
End: 2019-02-14

## 2019-02-14 NOTE — TELEPHONE ENCOUNTER
----- Message from Kandy Luo MA sent at 2/14/2019 11:44 AM CST -----  Contact: pt   Seen in past for foot and ankle pain , now having pain in instep and now and pain radiating up her calf and outside of foot   Call  Back     Please advise

## 2019-02-18 ENCOUNTER — HOSPITAL ENCOUNTER (OUTPATIENT)
Dept: RADIOLOGY | Facility: HOSPITAL | Age: 74
Discharge: HOME | End: 2019-02-18
Attending: ORTHOPAEDIC SURGERY
Payer: MEDICARE

## 2019-02-18 ENCOUNTER — OFFICE VISIT (OUTPATIENT)
Dept: ORTHOPEDICS | Facility: CLINIC | Age: 74
End: 2019-02-18
Payer: MEDICARE

## 2019-02-18 VITALS
BODY MASS INDEX: 33.93 KG/M2 | WEIGHT: 198.75 LBS | HEIGHT: 64 IN | SYSTOLIC BLOOD PRESSURE: 146 MMHG | DIASTOLIC BLOOD PRESSURE: 76 MMHG | HEART RATE: 79 BPM

## 2019-02-18 DIAGNOSIS — M79.671 RIGHT FOOT PAIN: Primary | ICD-10-CM

## 2019-02-18 DIAGNOSIS — M79.671 RIGHT FOOT PAIN: ICD-10-CM

## 2019-02-18 DIAGNOSIS — M19.071 ARTHRITIS OF RIGHT SUBTALAR JOINT: Primary | ICD-10-CM

## 2019-02-18 PROBLEM — M76.821 POSTERIOR TIBIAL TENDONITIS, RIGHT: Status: RESOLVED | Noted: 2018-10-30 | Resolved: 2019-02-18

## 2019-02-18 PROCEDURE — 73630 X-RAY EXAM OF FOOT: CPT | Mod: 26,RT,, | Performed by: RADIOLOGY

## 2019-02-18 PROCEDURE — 20600 SMALL JOINT ASPIRATION/INJECTION: R SUBTALAR: ICD-10-PCS | Mod: S$PBB,RT,, | Performed by: ORTHOPAEDIC SURGERY

## 2019-02-18 PROCEDURE — 73630 X-RAY EXAM OF FOOT: CPT | Mod: TC,PO,RT

## 2019-02-18 PROCEDURE — 99214 PR OFFICE/OUTPT VISIT, EST, LEVL IV, 30-39 MIN: ICD-10-PCS | Mod: S$PBB,25,, | Performed by: ORTHOPAEDIC SURGERY

## 2019-02-18 PROCEDURE — 20600 DRAIN/INJ JOINT/BURSA W/O US: CPT | Mod: PBBFAC,PN | Performed by: ORTHOPAEDIC SURGERY

## 2019-02-18 PROCEDURE — 99999 PR PBB SHADOW E&M-EST. PATIENT-LVL III: CPT | Mod: PBBFAC,,, | Performed by: ORTHOPAEDIC SURGERY

## 2019-02-18 PROCEDURE — 99214 OFFICE O/P EST MOD 30 MIN: CPT | Mod: S$PBB,25,, | Performed by: ORTHOPAEDIC SURGERY

## 2019-02-18 PROCEDURE — 99213 OFFICE O/P EST LOW 20 MIN: CPT | Mod: PBBFAC,25,PN | Performed by: ORTHOPAEDIC SURGERY

## 2019-02-18 PROCEDURE — 99999 PR PBB SHADOW E&M-EST. PATIENT-LVL III: ICD-10-PCS | Mod: PBBFAC,,, | Performed by: ORTHOPAEDIC SURGERY

## 2019-02-18 PROCEDURE — 73630 XR FOOT COMPLETE 3 VIEW RIGHT: ICD-10-PCS | Mod: 26,RT,, | Performed by: RADIOLOGY

## 2019-02-18 RX ORDER — MINERAL OIL
180 ENEMA (ML) RECTAL
COMMUNITY
End: 2019-11-13

## 2019-02-18 RX ADMIN — TRIAMCINOLONE ACETONIDE 40 MG: 40 INJECTION, SUSPENSION INTRA-ARTICULAR; INTRAMUSCULAR at 05:02

## 2019-02-18 NOTE — PROCEDURES
Small Joint Aspiration/Injection: R subtalar  Date/Time: 2/18/2019 5:03 PM  Performed by: Ibrahima Kelly MD  Authorized by: Ibrahima Kelly MD     Consent Done?:  Yes (Verbal)  Indications:  Pain  Site marked: The procedure site was marked      Location:  Foot  Site:  R subtalar  Prep: Patient was prepped and draped in usual sterile fashion    Needle size:  22 G  Medications:  40 mg triamcinolone acetonide 40 mg/mL  Patient tolerance:  Patient tolerated the procedure well with no immediate complications

## 2019-02-18 NOTE — PROGRESS NOTES
"HPI: Sarina Zavala is a 73 y.o. female who complains of bilateral foot pain in her arches. She rates her pain as 1/10 today.The injection I did in October did not improve her symptoms but the Celebrex did. She says it is completely pain free if she wears a wedge.  She says her endocrinologist had her stop the celebrex and mobic as they were causing GI issues.      PAST MEDICAL/SURGICAL/FAMILY/SOCIAL/ HISTORY: REVIEWED    ALLERGIES/MEDICATIONS: REVIEWED       PHYSICAL EXAM:  Vitals:    02/18/19 1559   BP: (!) 146/76   Pulse: 79     Ht Readings from Last 1 Encounters:   02/18/19 5' 4" (1.626 m)     Wt Readings from Last 1 Encounters:   02/18/19 90.2 kg (198 lb 11.9 oz)         GENERAL: Well developed, well nourished, no acute distress.  SKIN: Skin is intact. No atrophy, abrasions or lesions are noted.   Neurological: Normal mental status. Appropriate and conversant. Alert and oriented x 3.  GAIT: Walks with a non-antalgic gait.    Bilateral lower extremities:  2+ dorsalis pedis pulse.  Capillary refill < 3 seconds. Mildly decreased range of motion tibiotalar and subtalar joints. pes planovalgus R>>L.  5/5 strength EHL, FHL, tibialis anterior, gastrocsoleus, tibialis posterior and peroneals. Sensation to light touch intact sural, saphenous, superficial peroneal and deep peroneal nerves. No swelling right foot, no ecchymosis.  No lymphadenopathy, no masses or tumors palpated.   No tenderness to palpation at the posterior tibial tendon insertion. She is able to perform a single heel raise. She is tender to palpation at the subtalar joint.       XRAYS:   3 views of bilateral feet obtained and reviewed today reveal pes planovalgus, mild degenerative changes.       ASSESSMENT:           Encounter Diagnosis   Name Primary?    Arthritis of right subtalar joint Yes        PLAN:  I spent 20 minutes in consulation with the patient again today. I injected the right subtalar joint today. F/u prn.   "

## 2019-02-19 ENCOUNTER — TELEPHONE (OUTPATIENT)
Dept: ORTHOPEDICS | Facility: CLINIC | Age: 74
End: 2019-02-19

## 2019-02-19 NOTE — TELEPHONE ENCOUNTER
Spoke to patient. Patient states her ankle is in pain since the injection yesterday. Advised patient to rest and ice. Patient states understanding.

## 2019-02-19 NOTE — TELEPHONE ENCOUNTER
----- Message from Eros Oneal sent at 2/19/2019  9:18 AM CST -----  Contact: Patient  Type:  Patient Returning Call    Who Called:  Patient  Patient has Questions about visit yesterday  Best Call Back Number:    Additional Information:  Please call

## 2019-02-25 PROBLEM — M19.071 ARTHRITIS OF RIGHT SUBTALAR JOINT: Status: ACTIVE | Noted: 2019-02-25

## 2019-02-25 RX ORDER — TRIAMCINOLONE ACETONIDE 40 MG/ML
40 INJECTION, SUSPENSION INTRA-ARTICULAR; INTRAMUSCULAR
Status: DISCONTINUED | OUTPATIENT
Start: 2019-02-18 | End: 2019-02-25 | Stop reason: HOSPADM

## 2019-09-03 DIAGNOSIS — Z12.39 BREAST SCREENING: Primary | ICD-10-CM

## 2019-09-12 ENCOUNTER — HOSPITAL ENCOUNTER (OUTPATIENT)
Dept: RADIOLOGY | Facility: HOSPITAL | Age: 74
Discharge: HOME | End: 2019-09-12
Attending: SPECIALIST
Payer: MEDICARE

## 2019-09-12 VITALS — WEIGHT: 198 LBS | BODY MASS INDEX: 33.99 KG/M2

## 2019-09-12 DIAGNOSIS — Z12.39 BREAST SCREENING: ICD-10-CM

## 2019-09-12 PROCEDURE — 77067 SCR MAMMO BI INCL CAD: CPT | Mod: 26,,, | Performed by: RADIOLOGY

## 2019-09-12 PROCEDURE — 77067 SCR MAMMO BI INCL CAD: CPT | Mod: TC

## 2019-09-12 PROCEDURE — 77063 BREAST TOMOSYNTHESIS BI: CPT | Mod: 26,,, | Performed by: RADIOLOGY

## 2019-09-12 PROCEDURE — 77063 MAMMO DIGITAL SCREENING BILAT WITH TOMOSYNTHESIS_CAD: ICD-10-PCS | Mod: 26,,, | Performed by: RADIOLOGY

## 2019-09-12 PROCEDURE — 77067 MAMMO DIGITAL SCREENING BILAT WITH TOMOSYNTHESIS_CAD: ICD-10-PCS | Mod: 26,,, | Performed by: RADIOLOGY

## 2019-10-07 ENCOUNTER — LAB VISIT (OUTPATIENT)
Dept: LAB | Facility: HOSPITAL | Age: 74
End: 2019-10-07
Attending: INTERNAL MEDICINE
Payer: MEDICARE

## 2019-10-07 DIAGNOSIS — E78.5 HYPERLIPEMIA: Primary | ICD-10-CM

## 2019-10-07 DIAGNOSIS — I10 ESSENTIAL HYPERTENSION, MALIGNANT: ICD-10-CM

## 2019-10-07 LAB
CHOLEST SERPL-MCNC: 279 MG/DL (ref 120–199)
CHOLEST/HDLC SERPL: 5.6 {RATIO} (ref 2–5)
HDLC SERPL-MCNC: 50 MG/DL (ref 40–75)
HDLC SERPL: 17.9 % (ref 20–50)
LDLC SERPL CALC-MCNC: 175.4 MG/DL (ref 63–159)
NONHDLC SERPL-MCNC: 229 MG/DL
TRIGL SERPL-MCNC: 268 MG/DL (ref 30–150)

## 2019-10-07 PROCEDURE — 80061 LIPID PANEL: CPT

## 2019-10-16 DIAGNOSIS — E78.5 HYPERLIPEMIA: Primary | ICD-10-CM

## 2019-10-16 DIAGNOSIS — I10 ESSENTIAL HYPERTENSION, MALIGNANT: ICD-10-CM

## 2019-10-18 ENCOUNTER — HOSPITAL ENCOUNTER (OUTPATIENT)
Dept: RADIOLOGY | Facility: HOSPITAL | Age: 74
Discharge: HOME | End: 2019-10-18
Attending: INTERNAL MEDICINE
Payer: MEDICARE

## 2019-10-18 DIAGNOSIS — I10 ESSENTIAL HYPERTENSION, MALIGNANT: ICD-10-CM

## 2019-10-18 DIAGNOSIS — E78.5 HYPERLIPEMIA: ICD-10-CM

## 2019-10-18 PROCEDURE — 75571 CT HRT W/O DYE W/CA TEST: CPT | Mod: TC

## 2019-11-13 PROBLEM — M19.071 ARTHRITIS OF RIGHT SUBTALAR JOINT: Status: RESOLVED | Noted: 2019-02-25 | Resolved: 2019-11-13

## 2020-01-20 ENCOUNTER — OFFICE VISIT (OUTPATIENT)
Dept: ORTHOPEDICS | Facility: CLINIC | Age: 75
End: 2020-01-20
Payer: MEDICARE

## 2020-01-20 VITALS — BODY MASS INDEX: 34.5 KG/M2 | SYSTOLIC BLOOD PRESSURE: 142 MMHG | WEIGHT: 201 LBS | DIASTOLIC BLOOD PRESSURE: 90 MMHG

## 2020-01-20 DIAGNOSIS — M54.16 LUMBAR RADICULITIS: Primary | ICD-10-CM

## 2020-01-20 DIAGNOSIS — M48.062 LUMBAR STENOSIS WITH NEUROGENIC CLAUDICATION: ICD-10-CM

## 2020-01-20 DIAGNOSIS — M51.36 DISC DEGENERATION, LUMBAR: ICD-10-CM

## 2020-01-20 PROCEDURE — 1125F AMNT PAIN NOTED PAIN PRSNT: CPT | Mod: S$GLB,,, | Performed by: ORTHOPAEDIC SURGERY

## 2020-01-20 PROCEDURE — 1159F MED LIST DOCD IN RCRD: CPT | Mod: S$GLB,,, | Performed by: ORTHOPAEDIC SURGERY

## 2020-01-20 PROCEDURE — 1159F PR MEDICATION LIST DOCUMENTED IN MEDICAL RECORD: ICD-10-PCS | Mod: S$GLB,,, | Performed by: ORTHOPAEDIC SURGERY

## 2020-01-20 PROCEDURE — 99213 OFFICE O/P EST LOW 20 MIN: CPT | Mod: 25,S$GLB,, | Performed by: ORTHOPAEDIC SURGERY

## 2020-01-20 PROCEDURE — 1125F PR PAIN SEVERITY QUANTIFIED, PAIN PRESENT: ICD-10-PCS | Mod: S$GLB,,, | Performed by: ORTHOPAEDIC SURGERY

## 2020-01-20 PROCEDURE — 99213 PR OFFICE/OUTPT VISIT, EST, LEVL III, 20-29 MIN: ICD-10-PCS | Mod: 25,S$GLB,, | Performed by: ORTHOPAEDIC SURGERY

## 2020-01-20 NOTE — PROGRESS NOTES
Subjective:       Patient ID: Sarina Zavala is a 74 y.o. female.    Chief Complaint: Pain of the Lumbar Spine (Lumbar pain x 2 months that radiates down left leg to the knee. No other treatment. )      History of Present Illness  Mr. Velez patient comes in with a 2 month history of insidious onset of low back pain radiating down the left leg to her foot as symptoms have improved but not goes down to her knee she thinks that pain may have been started by altered gait pattern because the problem with the right foot. No bowel or bladder incontinence.    Current Medications  Current Outpatient Medications   Medication Sig Dispense Refill    aspirin (ENTERIC COATED ASPIRIN) 81 MG EC tablet Every day      calcium carbonate-vit D3-min (CALTRATE 600+D PLUS MINERALS) 600 mg calcium- 400 unit Tab Twice a day      carvedilol (COREG) 6.25 MG tablet Take 6.25 mg by mouth 2 (two) times daily with meals.        coenzyme Q10 400 mg Cap Take 400 mg by mouth Daily.      losartan (COZAAR) 25 MG tablet Take 25 mg by mouth once daily.      montelukast (SINGULAIR) 10 mg tablet Every day      multivitamin-minerals-lutein (CENTRUM SILVER) Tab Every day      pantoprazole (PROTONIX) 40 MG tablet Take 1 tablet (40 mg total) by mouth once daily. 90 tablet 3     No current facility-administered medications for this visit.        Allergies  Review of patient's allergies indicates:   Allergen Reactions    Sunflower oil Other (See Comments)    Povidone-iodine Itching    Soap Itching    Latex, natural rubber     Sunflower seed     Latex Rash and Swelling    Naproxen Rash       Past Medical History  Past Medical History:   Diagnosis Date    a Atypical Chest Pain With Normal LHC In 2015 ###    Dr. Daniel Christianson Is Her Cardiologist In [place]; 9/1/19 CT Ca+ Score = Was Reportedly Low    b Hypertension     c Hypercholesterolemia     We Are NOT Going For Goal, Just Low Dose Statin; She Has Had Myalgias With Crestor,  Lipitor, And Zocor    c Mild Hypertriglyceridemia     f Obesity     j GERD     Dr. URVASHI ingram H/O Cholecystectomy     j H/O Peptic Ulcer Disease     Dr. URVASHI ingram Irritable Bowel Syndrome     Dr. URVASHI yo H/O Bilateral Reduction Mammoplasty     l Cervical Spinal DDD With H/O Disc Surgery In 1980     l Gouty Arthropathy     l H/O Left Total Knee Replacement In 2008     n Generalized Anxiety 11/18/2019    [facility] In 6 Weeks; 11/18/19 (Phone Log) RXd Zoloft 25 Mg qHS, And Xanax 0.25 Mg Daily PRN    o Allergic Rhinosinusitis        Surgical History  Past Surgical History:   Procedure Laterality Date    APPENDECTOMY      BREAST SURGERY      CERVICAL FUSION      CHOLECYSTECTOMY      HIP ARTHROPLASTY      HYSTERECTOMY      35 years old    KNEE SURGERY      TONSILLECTOMY      TOTAL REDUCTION MAMMOPLASTY Bilateral     2000       Family History:   Family History   Problem Relation Age of Onset    Stroke Mother     Heart disease Mother     Hypertension Mother     Hyperlipidemia Mother     Heart disease Father     Hypertension Father     Hyperlipidemia Father     Breast cancer Maternal Aunt        Social History:   Social History     Socioeconomic History    Marital status:      Spouse name: Not on file    Number of children: Not on file    Years of education: Not on file    Highest education level: Not on file   Occupational History    Not on file   Social Needs    Financial resource strain: Not on file    Food insecurity:     Worry: Not on file     Inability: Not on file    Transportation needs:     Medical: Not on file     Non-medical: Not on file   Tobacco Use    Smoking status: Never Smoker    Smokeless tobacco: Never Used   Substance and Sexual Activity    Alcohol use: Not Currently    Drug use: No    Sexual activity: Yes     Partners: Male   Lifestyle    Physical activity:     Days per week: Not on file     Minutes per session: Not  on file    Stress: Not on file   Relationships    Social connections:     Talks on phone: Not on file     Gets together: Not on file     Attends Mosque service: Not on file     Active member of club or organization: Not on file     Attends meetings of clubs or organizations: Not on file     Relationship status: Not on file   Other Topics Concern    Not on file   Social History Narrative    Not on file       Hospitalization/Major Diagnostic Procedure:     Review of Systems     General/Constitutional:  Chills denies. Fatigue denies. Fever denies. Weight gain denies. Weight loss denies.    Respiratory:  Shortness of breath denies.    Cardiovascular:  Chest pain denies.    Gastrointestinal:  Constipation denies. Diarrhea denies. Nausea denies. Vomiting denies.     Hematology:  Easy bruising denies. Prolonged bleeding denies.     Genitourinary:  Frequent urination denies. Pain in lower back denies. Painful urination denies.     Musculoskeletal:  See HPI for details    Skin:  Rash denies.    Neurologic:  Dizziness denies. Gait abnormalities denies. Seizures denies. Tingling/Numbess denies.    Psychiatric:  Anxiety denies. Depressed mood denies.     Objective:   Vital Signs:   Vitals:    01/20/20 1448   BP: (!) 142/90        Physical Exam      General Examination:     Constitutional: The patient is alert and oriented to lace person and time. Mood is pleasant.     Head/Face: Normal facial features normal eyebrows    Eyes: Normal extraocular motion bilaterally    Lungs: Respirations are equal and unlabored    Gait is coordinated.    Cardiovascular: There are no swelling or varicosities present.    Lymphatic: Negative for adenopathy    Skin: Normal    Neurological: Level of consciousness normal. Oriented to place person and time and situation    Psychiatric: Oriented to time place person and situation    Physical exam shows lumbar tenderness over left posterior iliac spine range of motion moderate restricted hip and  knee range of motion normal straight leg raising causes back pain  XRAY Report/ Interpretation:  AP and lateral x-rays of lumbar spine will performed today. Indications low back pain. Findings:  A moderate disc space narrowing L5-S1 and L1-2 levels indicative longstanding degenerative disc disease  AP pelvis x-ray was taken today. Indications low back pain and/or hip pain. Findings AP pelvis x-ray appears to be normal with no evidence of fractures or significant degenerative disease    Assessment:       1. Lumbar radiculitis    2. Lumbar stenosis with neurogenic claudication    3. Disc degeneration, lumbar        Plan:       Sarina was seen today for pain.    Diagnoses and all orders for this visit:    Lumbar radiculitis  -     X-Ray Lumbar Spine Ap And Lateral  -     X-Ray Pelvis Routine AP  -     MRI Lumbar Spine Without Contrast; Future    Lumbar stenosis with neurogenic claudication  -     MRI Lumbar Spine Without Contrast; Future    Disc degeneration, lumbar  -     MRI Lumbar Spine Without Contrast; Future         Follow up in about 4 weeks (around 2/17/2020) for MRI Lumbar Results.    Lumbar MRI advised at this time    This note was created using Dragon voice recognition software that occasionally misinterpreted phrases or words.

## 2020-01-23 ENCOUNTER — HOSPITAL ENCOUNTER (OUTPATIENT)
Dept: RADIOLOGY | Facility: HOSPITAL | Age: 75
Discharge: HOME | End: 2020-01-23
Attending: ORTHOPAEDIC SURGERY
Payer: MEDICARE

## 2020-01-23 DIAGNOSIS — M54.16 LUMBAR RADICULITIS: ICD-10-CM

## 2020-01-23 DIAGNOSIS — M48.062 LUMBAR STENOSIS WITH NEUROGENIC CLAUDICATION: ICD-10-CM

## 2020-01-23 DIAGNOSIS — M51.36 DISC DEGENERATION, LUMBAR: ICD-10-CM

## 2020-01-23 PROCEDURE — 72148 MRI LUMBAR SPINE W/O DYE: CPT | Mod: TC,PO

## 2020-02-19 ENCOUNTER — OFFICE VISIT (OUTPATIENT)
Dept: ORTHOPEDICS | Facility: CLINIC | Age: 75
End: 2020-02-19
Payer: MEDICARE

## 2020-02-19 VITALS
WEIGHT: 201 LBS | HEIGHT: 64 IN | SYSTOLIC BLOOD PRESSURE: 126 MMHG | DIASTOLIC BLOOD PRESSURE: 78 MMHG | BODY MASS INDEX: 34.31 KG/M2 | HEART RATE: 74 BPM

## 2020-02-19 DIAGNOSIS — M51.36 DISC DEGENERATION, LUMBAR: Primary | ICD-10-CM

## 2020-02-19 PROCEDURE — 99213 PR OFFICE/OUTPT VISIT, EST, LEVL III, 20-29 MIN: ICD-10-PCS | Mod: S$GLB,,, | Performed by: ORTHOPAEDIC SURGERY

## 2020-02-19 PROCEDURE — 99213 OFFICE O/P EST LOW 20 MIN: CPT | Mod: S$GLB,,, | Performed by: ORTHOPAEDIC SURGERY

## 2020-02-19 NOTE — PROGRESS NOTES
Subjective:       Patient ID: Sarina Zavala is a 74 y.o. female.    Chief Complaint: Pain of the Lumbar Spine (Lumbar MRI results, states her back is doing better now that she is no longer taking Allopurinol for a gout flare up. She spoke to the prescriber and was told to d/c. Her symptoms have since resolved)      History of Present Illness  Patient returns for followup her back pain is much better than last visit she does have some joint radiation to buttock    Current Medications  Current Outpatient Medications   Medication Sig Dispense Refill    aspirin (ENTERIC COATED ASPIRIN) 81 MG EC tablet Every day      calcium carbonate-vit D3-min (CALTRATE 600+D PLUS MINERALS) 600 mg calcium- 400 unit Tab Twice a day      carvedilol (COREG) 6.25 MG tablet Take 6.25 mg by mouth 2 (two) times daily with meals.        coenzyme Q10 400 mg Cap Take 400 mg by mouth Daily.      losartan (COZAAR) 25 MG tablet Take 25 mg by mouth once daily.      montelukast (SINGULAIR) 10 mg tablet Every day      multivitamin-minerals-lutein (CENTRUM SILVER) Tab Every day      pantoprazole (PROTONIX) 40 MG tablet Take 1 tablet (40 mg total) by mouth once daily. 90 tablet 3     No current facility-administered medications for this visit.        Allergies  Review of patient's allergies indicates:   Allergen Reactions    Sunflower oil Other (See Comments)    Povidone-iodine Itching    Soap Itching    Latex, natural rubber     Sunflower seed     Latex Rash and Swelling    Naproxen Rash       Past Medical History  Past Medical History:   Diagnosis Date    a Atypical Chest Pain With Normal LHC In 2015 ###    Dr. Daniel Christianson Is Her Cardiologist In [place]; 9/1/19 CT Ca+ Score = Was Reportedly Low    b Hypertension     c Hypercholesterolemia     We Are NOT Going For Goal, Just Low Dose Statin; She Has Had Myalgias With Crestor, Lipitor, And Zocor    c Mild Hypertriglyceridemia     f Obesity     j GERD     Dr. LANDIN  Juan Manuel ingram H/O Cholecystectomy     j H/O Peptic Ulcer Disease     Dr. URVASHI ingram Irritable Bowel Syndrome     Dr. URVASHI yo H/O Bilateral Reduction Mammoplasty     l Cervical Spinal DDD With H/O Disc Surgery In 1980     l Gouty Arthropathy     l H/O Left Total Knee Replacement In 2008     n Generalized Anxiety 11/18/2019    RTC In 6 Weeks; 11/18/19 (Phone Log) RXd Zoloft 25 Mg qHS, And Xanax 0.25 Mg Daily PRN    o Allergic Rhinosinusitis        Surgical History  Past Surgical History:   Procedure Laterality Date    APPENDECTOMY      BREAST SURGERY      CERVICAL FUSION      CHOLECYSTECTOMY      HIP ARTHROPLASTY      HYSTERECTOMY      35 years old    KNEE SURGERY      TONSILLECTOMY      TOTAL REDUCTION MAMMOPLASTY Bilateral     2000       Family History:   Family History   Problem Relation Age of Onset    Stroke Mother     Heart disease Mother     Hypertension Mother     Hyperlipidemia Mother     Heart disease Father     Hypertension Father     Hyperlipidemia Father     Breast cancer Maternal Aunt        Social History:   Social History     Socioeconomic History    Marital status:      Spouse name: Not on file    Number of children: Not on file    Years of education: Not on file    Highest education level: Not on file   Occupational History    Not on file   Social Needs    Financial resource strain: Not on file    Food insecurity:     Worry: Not on file     Inability: Not on file    Transportation needs:     Medical: Not on file     Non-medical: Not on file   Tobacco Use    Smoking status: Never Smoker    Smokeless tobacco: Never Used   Substance and Sexual Activity    Alcohol use: Not Currently    Drug use: No    Sexual activity: Yes     Partners: Male   Lifestyle    Physical activity:     Days per week: Not on file     Minutes per session: Not on file    Stress: Not on file   Relationships    Social connections:     Talks on  phone: Not on file     Gets together: Not on file     Attends Hinduism service: Not on file     Active member of club or organization: Not on file     Attends meetings of clubs or organizations: Not on file     Relationship status: Not on file   Other Topics Concern    Not on file   Social History Narrative    Not on file       Hospitalization/Major Diagnostic Procedure:     Review of Systems     General/Constitutional:  Chills denies. Fatigue denies. Fever denies. Weight gain denies. Weight loss denies.    Respiratory:  Shortness of breath denies.    Cardiovascular:  Chest pain denies.    Gastrointestinal:  Constipation denies. Diarrhea denies. Nausea denies. Vomiting denies.     Hematology:  Easy bruising denies. Prolonged bleeding denies.     Genitourinary:  Frequent urination denies. Pain in lower back denies. Painful urination denies.     Musculoskeletal:  See HPI for details    Skin:  Rash denies.    Neurologic:  Dizziness denies. Gait abnormalities denies. Seizures denies. Tingling/Numbess denies.    Psychiatric:  Anxiety denies. Depressed mood denies.     Objective:   Vital Signs:   Vitals:    02/19/20 1121   BP: 126/78   Pulse: 74        Physical Exam      General Examination:     Constitutional: The patient is alert and oriented to lace person and time. Mood is pleasant.     Head/Face: Normal facial features normal eyebrows    Eyes: Normal extraocular motion bilaterally    Lungs: Respirations are equal and unlabored    Gait is coordinated.    Cardiovascular: There are no swelling or varicosities present.    Lymphatic: Negative for adenopathy    Skin: Normal    Neurological: Level of consciousness normal. Oriented to place person and time and situation    Psychiatric: Oriented to time place person and situation    Mild tenderness left posterior iliac spine of action of motion no spasm straight-leg-raising negative  XRAY Report/ Interpretation:  Lumbar MRI personally reviewed moderate disc space narrowing  L5 S1 level indicative lumbosacral degenerative disc disease the      Assessment:       1. Disc degeneration, lumbar        Plan:       Sarina was seen today for pain.    Diagnoses and all orders for this visit:    Disc degeneration, lumbar         No follow-ups on file.    Patient does not feel symptoms are severe enough to have any pain management referral observation advised return as needed    This note was created using Dragon voice recognition software that occasionally misinterpreted phrases or words.

## 2020-08-06 ENCOUNTER — LAB VISIT (OUTPATIENT)
Dept: LAB | Facility: HOSPITAL | Age: 75
End: 2020-08-06
Attending: INTERNAL MEDICINE
Payer: MEDICARE

## 2020-08-06 DIAGNOSIS — E55.9 VITAMIN D DEFICIENCY: ICD-10-CM

## 2020-08-06 DIAGNOSIS — E78.00 HYPERCHOLESTEROLEMIA: ICD-10-CM

## 2020-08-06 DIAGNOSIS — Z51.81 THERAPEUTIC DRUG MONITORING: ICD-10-CM

## 2020-08-06 DIAGNOSIS — D64.9 NORMOCYTIC ANEMIA: Primary | ICD-10-CM

## 2020-08-06 DIAGNOSIS — Z11.59 NEED FOR HEPATITIS C SCREENING TEST: ICD-10-CM

## 2020-08-06 DIAGNOSIS — E07.9 THYROID DISORDER: ICD-10-CM

## 2020-08-06 LAB
25(OH)D3+25(OH)D2 SERPL-MCNC: 40 NG/ML (ref 30–96)
ALBUMIN SERPL BCP-MCNC: 4.4 G/DL (ref 3.5–5.2)
ALP SERPL-CCNC: 49 U/L (ref 55–135)
ALT SERPL W/O P-5'-P-CCNC: 25 U/L (ref 10–44)
ANION GAP SERPL CALC-SCNC: 9 MMOL/L (ref 8–16)
AST SERPL-CCNC: 25 U/L (ref 10–40)
BASOPHILS # BLD AUTO: 0.05 K/UL (ref 0–0.2)
BASOPHILS NFR BLD: 0.8 % (ref 0–1.9)
BILIRUB SERPL-MCNC: 0.7 MG/DL (ref 0.1–1)
BUN SERPL-MCNC: 16 MG/DL (ref 8–23)
CALCIUM SERPL-MCNC: 9.8 MG/DL (ref 8.7–10.5)
CHLORIDE SERPL-SCNC: 104 MMOL/L (ref 95–110)
CHOLEST SERPL-MCNC: 277 MG/DL (ref 120–199)
CHOLEST/HDLC SERPL: 5.9 {RATIO} (ref 2–5)
CO2 SERPL-SCNC: 28 MMOL/L (ref 23–29)
CREAT SERPL-MCNC: 0.7 MG/DL (ref 0.5–1.4)
DIFFERENTIAL METHOD: ABNORMAL
EOSINOPHIL # BLD AUTO: 0.2 K/UL (ref 0–0.5)
EOSINOPHIL NFR BLD: 3.3 % (ref 0–8)
ERYTHROCYTE [DISTWIDTH] IN BLOOD BY AUTOMATED COUNT: 12.9 % (ref 11.5–14.5)
EST. GFR  (AFRICAN AMERICAN): >60 ML/MIN/1.73 M^2
EST. GFR  (NON AFRICAN AMERICAN): >60 ML/MIN/1.73 M^2
GLUCOSE SERPL-MCNC: 95 MG/DL (ref 70–110)
HCT VFR BLD AUTO: 43.1 % (ref 37–48.5)
HDLC SERPL-MCNC: 47 MG/DL (ref 40–75)
HDLC SERPL: 17 % (ref 20–50)
HGB BLD-MCNC: 14.2 G/DL (ref 12–16)
IMM GRANULOCYTES # BLD AUTO: 0.04 K/UL (ref 0–0.04)
IMM GRANULOCYTES NFR BLD AUTO: 0.6 % (ref 0–0.5)
LDLC SERPL CALC-MCNC: 178.8 MG/DL (ref 63–159)
LYMPHOCYTES # BLD AUTO: 1.5 K/UL (ref 1–4.8)
LYMPHOCYTES NFR BLD: 22 % (ref 18–48)
MCH RBC QN AUTO: 30.5 PG (ref 27–31)
MCHC RBC AUTO-ENTMCNC: 32.9 G/DL (ref 32–36)
MCV RBC AUTO: 93 FL (ref 82–98)
MONOCYTES # BLD AUTO: 0.6 K/UL (ref 0.3–1)
MONOCYTES NFR BLD: 9.4 % (ref 4–15)
NEUTROPHILS # BLD AUTO: 4.2 K/UL (ref 1.8–7.7)
NEUTROPHILS NFR BLD: 63.9 % (ref 38–73)
NONHDLC SERPL-MCNC: 230 MG/DL
NRBC BLD-RTO: 0 /100 WBC
PLATELET # BLD AUTO: 233 K/UL (ref 150–350)
PMV BLD AUTO: 8.5 FL (ref 9.2–12.9)
POTASSIUM SERPL-SCNC: 4.1 MMOL/L (ref 3.5–5.1)
PROT SERPL-MCNC: 7.2 G/DL (ref 6–8.4)
RBC # BLD AUTO: 4.65 M/UL (ref 4–5.4)
SODIUM SERPL-SCNC: 141 MMOL/L (ref 136–145)
T4 FREE SERPL-MCNC: 0.97 NG/DL (ref 0.71–1.51)
TRIGL SERPL-MCNC: 256 MG/DL (ref 30–150)
TSH SERPL DL<=0.005 MIU/L-ACNC: 2.2 UIU/ML (ref 0.34–5.6)
WBC # BLD AUTO: 6.63 K/UL (ref 3.9–12.7)

## 2020-08-06 PROCEDURE — 82306 VITAMIN D 25 HYDROXY: CPT

## 2020-08-06 PROCEDURE — 36415 COLL VENOUS BLD VENIPUNCTURE: CPT

## 2020-08-06 PROCEDURE — 84439 ASSAY OF FREE THYROXINE: CPT

## 2020-08-06 PROCEDURE — 80053 COMPREHEN METABOLIC PANEL: CPT

## 2020-08-06 PROCEDURE — 85025 COMPLETE CBC W/AUTO DIFF WBC: CPT

## 2020-08-06 PROCEDURE — 86803 HEPATITIS C AB TEST: CPT

## 2020-08-06 PROCEDURE — 84443 ASSAY THYROID STIM HORMONE: CPT

## 2020-08-06 PROCEDURE — 80061 LIPID PANEL: CPT

## 2020-08-08 LAB — HCV AB S/CO SERPL IA: <0.1 S/CO RATIO (ref 0–0.9)

## 2020-11-06 ENCOUNTER — HOSPITAL ENCOUNTER (OUTPATIENT)
Dept: RADIOLOGY | Facility: HOSPITAL | Age: 75
Discharge: HOME | End: 2020-11-06
Attending: INTERNAL MEDICINE
Payer: MEDICARE

## 2020-11-06 PROCEDURE — 77067 SCR MAMMO BI INCL CAD: CPT | Mod: TC,PO

## 2020-12-15 DIAGNOSIS — Z78.0 MENOPAUSE: Primary | ICD-10-CM

## 2021-02-05 ENCOUNTER — LAB VISIT (OUTPATIENT)
Dept: LAB | Facility: HOSPITAL | Age: 76
End: 2021-02-05
Attending: INTERNAL MEDICINE
Payer: MEDICARE

## 2021-02-05 DIAGNOSIS — Z79.899 ENCOUNTER FOR LONG-TERM (CURRENT) USE OF OTHER MEDICATIONS: Primary | ICD-10-CM

## 2021-02-05 LAB
ANION GAP SERPL CALC-SCNC: 9 MMOL/L (ref 8–16)
BUN SERPL-MCNC: 14 MG/DL (ref 8–23)
CALCIUM SERPL-MCNC: 9.7 MG/DL (ref 8.7–10.5)
CHLORIDE SERPL-SCNC: 102 MMOL/L (ref 95–110)
CO2 SERPL-SCNC: 29 MMOL/L (ref 23–29)
CREAT SERPL-MCNC: 0.6 MG/DL (ref 0.5–1.4)
EST. GFR  (AFRICAN AMERICAN): >60 ML/MIN/1.73 M^2
EST. GFR  (NON AFRICAN AMERICAN): >60 ML/MIN/1.73 M^2
GLUCOSE SERPL-MCNC: 99 MG/DL (ref 70–110)
POTASSIUM SERPL-SCNC: 4.5 MMOL/L (ref 3.5–5.1)
SODIUM SERPL-SCNC: 140 MMOL/L (ref 136–145)

## 2021-02-05 PROCEDURE — 80048 BASIC METABOLIC PNL TOTAL CA: CPT

## 2021-02-05 PROCEDURE — 36415 COLL VENOUS BLD VENIPUNCTURE: CPT

## 2021-02-09 ENCOUNTER — OFFICE VISIT (OUTPATIENT)
Dept: CARDIOLOGY | Facility: CLINIC | Age: 76
End: 2021-02-09
Payer: MEDICARE

## 2021-02-09 VITALS
BODY MASS INDEX: 35 KG/M2 | DIASTOLIC BLOOD PRESSURE: 60 MMHG | HEIGHT: 64 IN | SYSTOLIC BLOOD PRESSURE: 120 MMHG | HEART RATE: 99 BPM | OXYGEN SATURATION: 97 % | WEIGHT: 205 LBS

## 2021-02-09 DIAGNOSIS — R07.89 CHEST HEAVINESS: ICD-10-CM

## 2021-02-09 DIAGNOSIS — E78.00 HYPERCHOLESTEROLEMIA: ICD-10-CM

## 2021-02-09 DIAGNOSIS — I10 ESSENTIAL HYPERTENSION: ICD-10-CM

## 2021-02-09 PROCEDURE — 93000 ELECTROCARDIOGRAM COMPLETE: CPT | Mod: S$GLB,,, | Performed by: SPECIALIST

## 2021-02-09 PROCEDURE — 99214 OFFICE O/P EST MOD 30 MIN: CPT | Mod: S$GLB,,, | Performed by: INTERNAL MEDICINE

## 2021-02-09 PROCEDURE — 93000 EKG 12-LEAD: ICD-10-PCS | Mod: S$GLB,,, | Performed by: SPECIALIST

## 2021-02-09 PROCEDURE — 99214 PR OFFICE/OUTPT VISIT, EST, LEVL IV, 30-39 MIN: ICD-10-PCS | Mod: S$GLB,,, | Performed by: INTERNAL MEDICINE

## 2021-02-09 RX ORDER — ZINC GLUCONATE 50 MG
50 TABLET ORAL DAILY
COMMUNITY
End: 2022-05-06

## 2021-02-09 RX ORDER — ASCORBIC ACID 500 MG
500 TABLET ORAL DAILY
COMMUNITY
End: 2022-05-06

## 2021-02-10 ENCOUNTER — TELEPHONE (OUTPATIENT)
Dept: CARDIOLOGY | Facility: CLINIC | Age: 76
End: 2021-02-10

## 2021-02-10 DIAGNOSIS — R07.89 CHEST HEAVINESS: Primary | ICD-10-CM

## 2021-03-01 ENCOUNTER — HOSPITAL ENCOUNTER (OUTPATIENT)
Dept: CARDIOLOGY | Facility: CLINIC | Age: 76
Discharge: HOME | End: 2021-03-01
Attending: INTERNAL MEDICINE
Payer: MEDICARE

## 2021-03-01 ENCOUNTER — HOSPITAL ENCOUNTER (OUTPATIENT)
Dept: RADIOLOGY | Facility: CLINIC | Age: 76
Discharge: HOME | End: 2021-03-01
Attending: INTERNAL MEDICINE
Payer: MEDICARE

## 2021-03-01 VITALS — BODY MASS INDEX: 35 KG/M2 | WEIGHT: 205 LBS | HEIGHT: 64 IN

## 2021-03-01 DIAGNOSIS — R07.89 CHEST HEAVINESS: ICD-10-CM

## 2021-03-01 DIAGNOSIS — I10 ESSENTIAL HYPERTENSION: ICD-10-CM

## 2021-03-01 DIAGNOSIS — E78.00 HYPERCHOLESTEROLEMIA: ICD-10-CM

## 2021-03-01 PROCEDURE — 93306 ECHO (CUPID ONLY): ICD-10-PCS | Mod: S$GLB,,, | Performed by: INTERNAL MEDICINE

## 2021-03-01 PROCEDURE — A9502 TC99M TETROFOSMIN: HCPCS | Mod: S$GLB,,, | Performed by: INTERNAL MEDICINE

## 2021-03-01 PROCEDURE — A9502 STRESS TEST WITH MYOCARDIAL PERFUSION (CUPID ONLY): ICD-10-PCS | Mod: S$GLB,,, | Performed by: INTERNAL MEDICINE

## 2021-03-01 PROCEDURE — 93306 TTE W/DOPPLER COMPLETE: CPT | Mod: S$GLB,,, | Performed by: INTERNAL MEDICINE

## 2021-03-01 PROCEDURE — 93015 STRESS TEST WITH MYOCARDIAL PERFUSION (CUPID ONLY): ICD-10-PCS | Mod: S$GLB,,, | Performed by: INTERNAL MEDICINE

## 2021-03-01 PROCEDURE — 78452 HT MUSCLE IMAGE SPECT MULT: CPT | Mod: S$GLB,,, | Performed by: INTERNAL MEDICINE

## 2021-03-01 PROCEDURE — 93015 CV STRESS TEST SUPVJ I&R: CPT | Mod: S$GLB,,, | Performed by: INTERNAL MEDICINE

## 2021-03-01 PROCEDURE — 78452 STRESS TEST WITH MYOCARDIAL PERFUSION (CUPID ONLY): ICD-10-PCS | Mod: S$GLB,,, | Performed by: INTERNAL MEDICINE

## 2021-03-01 RX ORDER — REGADENOSON 0.08 MG/ML
0.4 INJECTION, SOLUTION INTRAVENOUS ONCE
Status: COMPLETED | OUTPATIENT
Start: 2021-03-01 | End: 2021-03-01

## 2021-03-01 RX ADMIN — REGADENOSON 0.4 MG: 0.08 INJECTION, SOLUTION INTRAVENOUS at 09:03

## 2021-03-02 LAB
AORTIC ROOT ANNULUS: 3 CM
AORTIC VALVE CUSP SEPERATION: 1.9 CM
AV INDEX (PROSTH): 0.81
AV MEAN GRADIENT: 3 MMHG
AV PEAK GRADIENT: 6 MMHG
AV REGURGITATION PRESSURE HALF TIME: 793 MS
AV VALVE AREA: 2.79 CM2
AV VELOCITY RATIO: 0.77
BSA FOR ECHO PROCEDURE: 2.05 M2
CV ECHO LV RWT: 0.5 CM
CV PHARM DOSE: 0.4 MG
CV STRESS BASE HR: 70 BPM
DIASTOLIC BLOOD PRESSURE: 93 MMHG
DOP CALC AO PEAK VEL: 1.24 M/S
DOP CALC AO VTI: 26.9 CM
DOP CALC LVOT AREA: 3.5 CM2
DOP CALC LVOT DIAMETER: 2.1 CM
DOP CALC LVOT PEAK VEL: 0.96 M/S
DOP CALC LVOT STROKE VOLUME: 75.12 CM3
DOP CALCLVOT PEAK VEL VTI: 21.7 CM
E WAVE DECELERATION TIME: 164 MS
E/A RATIO: 0.81
E/E' RATIO: 10.91 M/S
ECHO LV POSTERIOR WALL: 1.11 CM (ref 0.6–1.1)
EJECTION FRACTION- HIGH: 65 %
END DIASTOLIC INDEX-HIGH: 158 ML/M2
END DIASTOLIC INDEX-LOW: 94 ML/M2
END SYSTOLIC INDEX-HIGH: 71 ML/M2
END SYSTOLIC INDEX-LOW: 33 ML/M2
FRACTIONAL SHORTENING: 35 % (ref 28–44)
INTERVENTRICULAR SEPTUM: 1.24 CM (ref 0.6–1.1)
IVRT: 95 MS
LA MAJOR: 3.9 CM
LEFT ATRIUM SIZE: 4.2 CM
LEFT INTERNAL DIMENSION IN SYSTOLE: 2.89 CM (ref 2.1–4)
LEFT VENTRICLE MASS INDEX: 94 G/M2
LEFT VENTRICULAR INTERNAL DIMENSION IN DIASTOLE: 4.42 CM (ref 3.5–6)
LEFT VENTRICULAR MASS: 186.92 G
LV LATERAL E/E' RATIO: 8.57 M/S
LV SEPTAL E/E' RATIO: 15 M/S
MV PEAK A VEL: 0.74 M/S
MV PEAK E VEL: 0.6 M/S
NUC STRESS DIASTOLIC VOLUME INDEX: 79
NUC STRESS EJECTION FRACTION: 70 %
NUC STRESS SYSTOLIC VOLUME INDEX: 23
OHS CV CPX 1 MINUTE RECOVERY HEART RATE: 109 BPM
OHS CV CPX 85 PERCENT MAX PREDICTED HEART RATE MALE: 119
OHS CV CPX MAX PREDICTED HEART RATE: 140
OHS CV CPX PATIENT IS FEMALE: 1
OHS CV CPX PATIENT IS MALE: 0
OHS CV CPX PEAK DIASTOLIC BLOOD PRESSURE: 64 MMHG
OHS CV CPX PEAK HEAR RATE: 109 BPM
OHS CV CPX PEAK RATE PRESSURE PRODUCT: NORMAL
OHS CV CPX PEAK SYSTOLIC BLOOD PRESSURE: 132 MMHG
OHS CV CPX PERCENT MAX PREDICTED HEART RATE ACHIEVED: 78
OHS CV CPX RATE PRESSURE PRODUCT PRESENTING: NORMAL
PISA TR MAX VEL: 1.93 M/S
RA PRESSURE: 3 MMHG
RETIRED EF AND QEF - SEE NOTES: 53 %
RIGHT VENTRICULAR END-DIASTOLIC DIMENSION: 1.61 CM
SYSTOLIC BLOOD PRESSURE: 152 MMHG
TDI LATERAL: 0.07 M/S
TDI SEPTAL: 0.04 M/S
TDI: 0.06 M/S
TR MAX PG: 15 MMHG
TV REST PULMONARY ARTERY PRESSURE: 18 MMHG

## 2021-03-09 ENCOUNTER — HOSPITAL ENCOUNTER (OUTPATIENT)
Dept: RADIOLOGY | Facility: HOSPITAL | Age: 76
Discharge: HOME | End: 2021-03-09
Attending: SPECIALIST
Payer: MEDICARE

## 2021-03-09 ENCOUNTER — OFFICE VISIT (OUTPATIENT)
Dept: CARDIOLOGY | Facility: CLINIC | Age: 76
End: 2021-03-09
Payer: MEDICARE

## 2021-03-09 VITALS
WEIGHT: 201 LBS | BODY MASS INDEX: 34.31 KG/M2 | DIASTOLIC BLOOD PRESSURE: 70 MMHG | SYSTOLIC BLOOD PRESSURE: 132 MMHG | HEIGHT: 64 IN | HEART RATE: 87 BPM

## 2021-03-09 DIAGNOSIS — R07.89 CHEST HEAVINESS: ICD-10-CM

## 2021-03-09 DIAGNOSIS — I10 ESSENTIAL HYPERTENSION: ICD-10-CM

## 2021-03-09 DIAGNOSIS — Z78.0 MENOPAUSE: ICD-10-CM

## 2021-03-09 DIAGNOSIS — E78.00 HYPERCHOLESTEROLEMIA: Primary | ICD-10-CM

## 2021-03-09 PROCEDURE — 77080 DXA BONE DENSITY AXIAL: CPT | Mod: TC,PO

## 2021-03-09 PROCEDURE — 99213 PR OFFICE/OUTPT VISIT, EST, LEVL III, 20-29 MIN: ICD-10-PCS | Mod: S$GLB,,, | Performed by: INTERNAL MEDICINE

## 2021-03-09 PROCEDURE — 3075F SYST BP GE 130 - 139MM HG: CPT | Mod: CPTII,S$GLB,, | Performed by: INTERNAL MEDICINE

## 2021-03-09 PROCEDURE — 1159F MED LIST DOCD IN RCRD: CPT | Mod: S$GLB,,, | Performed by: INTERNAL MEDICINE

## 2021-03-09 PROCEDURE — 3078F DIAST BP <80 MM HG: CPT | Mod: CPTII,S$GLB,, | Performed by: INTERNAL MEDICINE

## 2021-03-09 PROCEDURE — 99213 OFFICE O/P EST LOW 20 MIN: CPT | Mod: S$GLB,,, | Performed by: INTERNAL MEDICINE

## 2021-03-09 PROCEDURE — 3288F FALL RISK ASSESSMENT DOCD: CPT | Mod: CPTII,S$GLB,, | Performed by: INTERNAL MEDICINE

## 2021-03-09 PROCEDURE — 1101F PT FALLS ASSESS-DOCD LE1/YR: CPT | Mod: CPTII,S$GLB,, | Performed by: INTERNAL MEDICINE

## 2021-03-09 PROCEDURE — 1159F PR MEDICATION LIST DOCUMENTED IN MEDICAL RECORD: ICD-10-PCS | Mod: S$GLB,,, | Performed by: INTERNAL MEDICINE

## 2021-03-09 PROCEDURE — 1101F PR PT FALLS ASSESS DOC 0-1 FALLS W/OUT INJ PAST YR: ICD-10-PCS | Mod: CPTII,S$GLB,, | Performed by: INTERNAL MEDICINE

## 2021-03-09 PROCEDURE — 3288F PR FALLS RISK ASSESSMENT DOCUMENTED: ICD-10-PCS | Mod: CPTII,S$GLB,, | Performed by: INTERNAL MEDICINE

## 2021-03-09 PROCEDURE — 3078F PR MOST RECENT DIASTOLIC BLOOD PRESSURE < 80 MM HG: ICD-10-PCS | Mod: CPTII,S$GLB,, | Performed by: INTERNAL MEDICINE

## 2021-03-09 PROCEDURE — 3075F PR MOST RECENT SYSTOLIC BLOOD PRESS GE 130-139MM HG: ICD-10-PCS | Mod: CPTII,S$GLB,, | Performed by: INTERNAL MEDICINE

## 2021-03-12 ENCOUNTER — TELEPHONE (OUTPATIENT)
Dept: CARDIOLOGY | Facility: CLINIC | Age: 76
End: 2021-03-12

## 2021-03-12 ENCOUNTER — TELEPHONE (OUTPATIENT)
Dept: ORTHOPEDICS | Facility: CLINIC | Age: 76
End: 2021-03-12

## 2021-03-24 ENCOUNTER — TELEPHONE (OUTPATIENT)
Dept: CARDIOLOGY | Facility: CLINIC | Age: 76
End: 2021-03-24

## 2021-04-01 ENCOUNTER — TELEPHONE (OUTPATIENT)
Dept: ORTHOPEDICS | Facility: CLINIC | Age: 76
End: 2021-04-01

## 2021-04-01 DIAGNOSIS — M79.671 RIGHT FOOT PAIN: Primary | ICD-10-CM

## 2021-04-21 ENCOUNTER — LAB VISIT (OUTPATIENT)
Dept: LAB | Facility: HOSPITAL | Age: 76
End: 2021-04-21
Attending: INTERNAL MEDICINE
Payer: MEDICARE

## 2021-04-21 DIAGNOSIS — E78.00 HYPERCHOLESTEROLEMIA: ICD-10-CM

## 2021-04-21 DIAGNOSIS — I10 ESSENTIAL HYPERTENSION: ICD-10-CM

## 2021-04-21 DIAGNOSIS — R07.89 CHEST HEAVINESS: ICD-10-CM

## 2021-04-21 LAB
ALBUMIN SERPL BCP-MCNC: 4.2 G/DL (ref 3.5–5.2)
ALP SERPL-CCNC: 48 U/L (ref 55–135)
ALT SERPL W/O P-5'-P-CCNC: 27 U/L (ref 10–44)
ANION GAP SERPL CALC-SCNC: 7 MMOL/L (ref 8–16)
AST SERPL-CCNC: 23 U/L (ref 10–40)
BILIRUB SERPL-MCNC: 0.9 MG/DL (ref 0.1–1)
BUN SERPL-MCNC: 21 MG/DL (ref 8–23)
CALCIUM SERPL-MCNC: 9.6 MG/DL (ref 8.7–10.5)
CHLORIDE SERPL-SCNC: 103 MMOL/L (ref 95–110)
CHOLEST SERPL-MCNC: 261 MG/DL (ref 120–199)
CHOLEST/HDLC SERPL: 5.2 {RATIO} (ref 2–5)
CO2 SERPL-SCNC: 31 MMOL/L (ref 23–29)
CREAT SERPL-MCNC: 0.6 MG/DL (ref 0.5–1.4)
EST. GFR  (AFRICAN AMERICAN): >60 ML/MIN/1.73 M^2
EST. GFR  (NON AFRICAN AMERICAN): >60 ML/MIN/1.73 M^2
GLUCOSE SERPL-MCNC: 100 MG/DL (ref 70–110)
HDLC SERPL-MCNC: 50 MG/DL (ref 40–75)
HDLC SERPL: 19.2 % (ref 20–50)
LDLC SERPL CALC-MCNC: 164 MG/DL (ref 63–159)
NONHDLC SERPL-MCNC: 211 MG/DL
POTASSIUM SERPL-SCNC: 4.3 MMOL/L (ref 3.5–5.1)
PROT SERPL-MCNC: 7 G/DL (ref 6–8.4)
SODIUM SERPL-SCNC: 141 MMOL/L (ref 136–145)
T4 FREE SERPL-MCNC: 0.74 NG/DL (ref 0.71–1.51)
TRIGL SERPL-MCNC: 235 MG/DL (ref 30–150)
TSH SERPL DL<=0.005 MIU/L-ACNC: 2.58 UIU/ML (ref 0.34–5.6)

## 2021-04-21 PROCEDURE — 84439 ASSAY OF FREE THYROXINE: CPT | Performed by: INTERNAL MEDICINE

## 2021-04-21 PROCEDURE — 84443 ASSAY THYROID STIM HORMONE: CPT | Performed by: INTERNAL MEDICINE

## 2021-04-21 PROCEDURE — 80053 COMPREHEN METABOLIC PANEL: CPT | Performed by: INTERNAL MEDICINE

## 2021-04-21 PROCEDURE — 80061 LIPID PANEL: CPT | Performed by: INTERNAL MEDICINE

## 2021-04-21 PROCEDURE — 36415 COLL VENOUS BLD VENIPUNCTURE: CPT | Performed by: INTERNAL MEDICINE

## 2021-05-04 ENCOUNTER — TELEPHONE (OUTPATIENT)
Dept: CARDIOLOGY | Facility: CLINIC | Age: 76
End: 2021-05-04

## 2021-05-18 ENCOUNTER — HOSPITAL ENCOUNTER (OUTPATIENT)
Dept: RADIOLOGY | Facility: HOSPITAL | Age: 76
Discharge: HOME | End: 2021-05-18
Attending: ORTHOPAEDIC SURGERY
Payer: MEDICARE

## 2021-05-18 ENCOUNTER — OFFICE VISIT (OUTPATIENT)
Dept: ORTHOPEDICS | Facility: CLINIC | Age: 76
End: 2021-05-18
Payer: MEDICARE

## 2021-05-18 VITALS
HEART RATE: 78 BPM | HEIGHT: 64 IN | BODY MASS INDEX: 34.33 KG/M2 | DIASTOLIC BLOOD PRESSURE: 72 MMHG | SYSTOLIC BLOOD PRESSURE: 181 MMHG | WEIGHT: 201.06 LBS

## 2021-05-18 DIAGNOSIS — M20.41 HAMMERTOE OF RIGHT FOOT: Primary | ICD-10-CM

## 2021-05-18 DIAGNOSIS — M79.671 RIGHT FOOT PAIN: ICD-10-CM

## 2021-05-18 PROCEDURE — 1125F PR PAIN SEVERITY QUANTIFIED, PAIN PRESENT: ICD-10-PCS | Mod: S$GLB,,, | Performed by: ORTHOPAEDIC SURGERY

## 2021-05-18 PROCEDURE — 73630 X-RAY EXAM OF FOOT: CPT | Mod: TC,PO,RT

## 2021-05-18 PROCEDURE — 1159F PR MEDICATION LIST DOCUMENTED IN MEDICAL RECORD: ICD-10-PCS | Mod: S$GLB,,, | Performed by: ORTHOPAEDIC SURGERY

## 2021-05-18 PROCEDURE — 1159F MED LIST DOCD IN RCRD: CPT | Mod: S$GLB,,, | Performed by: ORTHOPAEDIC SURGERY

## 2021-05-18 PROCEDURE — 73630 X-RAY EXAM OF FOOT: CPT | Mod: 26,RT,, | Performed by: RADIOLOGY

## 2021-05-18 PROCEDURE — 99214 PR OFFICE/OUTPT VISIT, EST, LEVL IV, 30-39 MIN: ICD-10-PCS | Mod: S$GLB,,, | Performed by: ORTHOPAEDIC SURGERY

## 2021-05-18 PROCEDURE — 99999 PR PBB SHADOW E&M-EST. PATIENT-LVL III: CPT | Mod: PBBFAC,,, | Performed by: ORTHOPAEDIC SURGERY

## 2021-05-18 PROCEDURE — 3288F PR FALLS RISK ASSESSMENT DOCUMENTED: ICD-10-PCS | Mod: CPTII,S$GLB,, | Performed by: ORTHOPAEDIC SURGERY

## 2021-05-18 PROCEDURE — 3288F FALL RISK ASSESSMENT DOCD: CPT | Mod: CPTII,S$GLB,, | Performed by: ORTHOPAEDIC SURGERY

## 2021-05-18 PROCEDURE — 1101F PT FALLS ASSESS-DOCD LE1/YR: CPT | Mod: CPTII,S$GLB,, | Performed by: ORTHOPAEDIC SURGERY

## 2021-05-18 PROCEDURE — 99214 OFFICE O/P EST MOD 30 MIN: CPT | Mod: S$GLB,,, | Performed by: ORTHOPAEDIC SURGERY

## 2021-05-18 PROCEDURE — 73630 XR FOOT COMPLETE 3 VIEW RIGHT: ICD-10-PCS | Mod: 26,RT,, | Performed by: RADIOLOGY

## 2021-05-18 PROCEDURE — 1101F PR PT FALLS ASSESS DOC 0-1 FALLS W/OUT INJ PAST YR: ICD-10-PCS | Mod: CPTII,S$GLB,, | Performed by: ORTHOPAEDIC SURGERY

## 2021-05-18 PROCEDURE — 1125F AMNT PAIN NOTED PAIN PRSNT: CPT | Mod: S$GLB,,, | Performed by: ORTHOPAEDIC SURGERY

## 2021-05-18 PROCEDURE — 99999 PR PBB SHADOW E&M-EST. PATIENT-LVL III: ICD-10-PCS | Mod: PBBFAC,,, | Performed by: ORTHOPAEDIC SURGERY

## 2021-05-20 ENCOUNTER — TELEPHONE (OUTPATIENT)
Dept: CARDIOLOGY | Facility: CLINIC | Age: 76
End: 2021-05-20

## 2021-05-20 PROBLEM — M20.41 HAMMERTOE OF RIGHT FOOT: Status: ACTIVE | Noted: 2021-05-20

## 2021-06-01 ENCOUNTER — TELEPHONE (OUTPATIENT)
Dept: ORTHOPEDICS | Facility: CLINIC | Age: 76
End: 2021-06-01

## 2021-06-15 ENCOUNTER — OFFICE VISIT (OUTPATIENT)
Dept: CARDIOLOGY | Facility: CLINIC | Age: 76
End: 2021-06-15
Payer: MEDICARE

## 2021-06-15 VITALS
SYSTOLIC BLOOD PRESSURE: 160 MMHG | OXYGEN SATURATION: 97 % | WEIGHT: 199 LBS | DIASTOLIC BLOOD PRESSURE: 80 MMHG | BODY MASS INDEX: 33.97 KG/M2 | HEIGHT: 64 IN | HEART RATE: 75 BPM

## 2021-06-15 DIAGNOSIS — E78.00 HYPERCHOLESTEROLEMIA: ICD-10-CM

## 2021-06-15 DIAGNOSIS — K58.9 IRRITABLE BOWEL SYNDROME, UNSPECIFIED TYPE: ICD-10-CM

## 2021-06-15 DIAGNOSIS — I10 ESSENTIAL HYPERTENSION: ICD-10-CM

## 2021-06-15 PROCEDURE — 1159F PR MEDICATION LIST DOCUMENTED IN MEDICAL RECORD: ICD-10-PCS | Mod: S$GLB,,, | Performed by: INTERNAL MEDICINE

## 2021-06-15 PROCEDURE — 3288F FALL RISK ASSESSMENT DOCD: CPT | Mod: CPTII,S$GLB,, | Performed by: INTERNAL MEDICINE

## 2021-06-15 PROCEDURE — 1101F PT FALLS ASSESS-DOCD LE1/YR: CPT | Mod: CPTII,S$GLB,, | Performed by: INTERNAL MEDICINE

## 2021-06-15 PROCEDURE — 3079F DIAST BP 80-89 MM HG: CPT | Mod: CPTII,S$GLB,, | Performed by: INTERNAL MEDICINE

## 2021-06-15 PROCEDURE — 99214 PR OFFICE/OUTPT VISIT, EST, LEVL IV, 30-39 MIN: ICD-10-PCS | Mod: S$GLB,,, | Performed by: INTERNAL MEDICINE

## 2021-06-15 PROCEDURE — 1101F PR PT FALLS ASSESS DOC 0-1 FALLS W/OUT INJ PAST YR: ICD-10-PCS | Mod: CPTII,S$GLB,, | Performed by: INTERNAL MEDICINE

## 2021-06-15 PROCEDURE — 3077F SYST BP >= 140 MM HG: CPT | Mod: CPTII,S$GLB,, | Performed by: INTERNAL MEDICINE

## 2021-06-15 PROCEDURE — 1159F MED LIST DOCD IN RCRD: CPT | Mod: S$GLB,,, | Performed by: INTERNAL MEDICINE

## 2021-06-15 PROCEDURE — 3079F PR MOST RECENT DIASTOLIC BLOOD PRESSURE 80-89 MM HG: ICD-10-PCS | Mod: CPTII,S$GLB,, | Performed by: INTERNAL MEDICINE

## 2021-06-15 PROCEDURE — 99214 OFFICE O/P EST MOD 30 MIN: CPT | Mod: S$GLB,,, | Performed by: INTERNAL MEDICINE

## 2021-06-15 PROCEDURE — 3077F PR MOST RECENT SYSTOLIC BLOOD PRESSURE >= 140 MM HG: ICD-10-PCS | Mod: CPTII,S$GLB,, | Performed by: INTERNAL MEDICINE

## 2021-06-15 PROCEDURE — 3288F PR FALLS RISK ASSESSMENT DOCUMENTED: ICD-10-PCS | Mod: CPTII,S$GLB,, | Performed by: INTERNAL MEDICINE

## 2021-06-15 RX ORDER — CARVEDILOL 12.5 MG/1
12.5 TABLET ORAL 2 TIMES DAILY WITH MEALS
Qty: 60 TABLET | Refills: 11 | Status: SHIPPED | OUTPATIENT
Start: 2021-06-15 | End: 2022-03-16 | Stop reason: SDUPTHER

## 2021-06-15 RX ORDER — BEMPEDOIC ACID 180 MG/1
180 TABLET, FILM COATED ORAL DAILY
Qty: 30 TABLET | Refills: 11 | Status: SHIPPED | OUTPATIENT
Start: 2021-06-15 | End: 2021-07-16

## 2021-12-03 DIAGNOSIS — Z12.31 ENCOUNTER FOR SCREENING MAMMOGRAM FOR MALIGNANT NEOPLASM OF BREAST: Primary | ICD-10-CM

## 2022-01-26 RX ORDER — LOSARTAN POTASSIUM 25 MG/1
25 TABLET ORAL DAILY
Qty: 90 TABLET | Refills: 3 | Status: SHIPPED | OUTPATIENT
Start: 2022-01-26 | End: 2022-01-27 | Stop reason: SDUPTHER

## 2022-01-27 RX ORDER — LOSARTAN POTASSIUM 25 MG/1
25 TABLET ORAL DAILY
Qty: 90 TABLET | Refills: 3 | Status: SHIPPED | OUTPATIENT
Start: 2022-01-27 | End: 2023-05-08 | Stop reason: SDUPTHER

## 2022-02-08 ENCOUNTER — HOSPITAL ENCOUNTER (OUTPATIENT)
Dept: RADIOLOGY | Facility: HOSPITAL | Age: 77
Discharge: HOME | End: 2022-02-08
Attending: SPECIALIST
Payer: MEDICARE

## 2022-02-08 DIAGNOSIS — Z12.31 ENCOUNTER FOR SCREENING MAMMOGRAM FOR MALIGNANT NEOPLASM OF BREAST: ICD-10-CM

## 2022-02-08 PROCEDURE — 77067 SCR MAMMO BI INCL CAD: CPT | Mod: TC,PO

## 2022-02-08 PROCEDURE — 77063 BREAST TOMOSYNTHESIS BI: CPT | Mod: TC,PO

## 2022-03-14 PROBLEM — E55.9 VITAMIN D DEFICIENCY, UNSPECIFIED: Status: ACTIVE | Noted: 2022-03-14

## 2022-03-15 ENCOUNTER — OFFICE VISIT (OUTPATIENT)
Dept: CARDIOLOGY | Facility: CLINIC | Age: 77
End: 2022-03-15
Payer: MEDICARE

## 2022-03-15 VITALS
HEIGHT: 64 IN | WEIGHT: 197 LBS | OXYGEN SATURATION: 97 % | SYSTOLIC BLOOD PRESSURE: 130 MMHG | DIASTOLIC BLOOD PRESSURE: 70 MMHG | HEART RATE: 77 BPM | BODY MASS INDEX: 33.63 KG/M2

## 2022-03-15 DIAGNOSIS — I51.89 DIASTOLIC DYSFUNCTION: ICD-10-CM

## 2022-03-15 DIAGNOSIS — E78.1 HYPERTRIGLYCERIDEMIA: ICD-10-CM

## 2022-03-15 DIAGNOSIS — I10 ESSENTIAL HYPERTENSION: ICD-10-CM

## 2022-03-15 DIAGNOSIS — R09.89 RIGHT CAROTID BRUIT: ICD-10-CM

## 2022-03-15 DIAGNOSIS — E78.00 HYPERCHOLESTEROLEMIA: ICD-10-CM

## 2022-03-15 DIAGNOSIS — E55.9 VITAMIN D DEFICIENCY, UNSPECIFIED: ICD-10-CM

## 2022-03-15 PROCEDURE — 1159F MED LIST DOCD IN RCRD: CPT | Mod: CPTII,S$GLB,, | Performed by: INTERNAL MEDICINE

## 2022-03-15 PROCEDURE — 99215 PR OFFICE/OUTPT VISIT, EST, LEVL V, 40-54 MIN: ICD-10-PCS | Mod: S$GLB,,, | Performed by: INTERNAL MEDICINE

## 2022-03-15 PROCEDURE — 3075F SYST BP GE 130 - 139MM HG: CPT | Mod: CPTII,S$GLB,, | Performed by: INTERNAL MEDICINE

## 2022-03-15 PROCEDURE — 3075F PR MOST RECENT SYSTOLIC BLOOD PRESS GE 130-139MM HG: ICD-10-PCS | Mod: CPTII,S$GLB,, | Performed by: INTERNAL MEDICINE

## 2022-03-15 PROCEDURE — 1160F RVW MEDS BY RX/DR IN RCRD: CPT | Mod: CPTII,S$GLB,, | Performed by: INTERNAL MEDICINE

## 2022-03-15 PROCEDURE — 1160F PR REVIEW ALL MEDS BY PRESCRIBER/CLIN PHARMACIST DOCUMENTED: ICD-10-PCS | Mod: CPTII,S$GLB,, | Performed by: INTERNAL MEDICINE

## 2022-03-15 PROCEDURE — 3078F PR MOST RECENT DIASTOLIC BLOOD PRESSURE < 80 MM HG: ICD-10-PCS | Mod: CPTII,S$GLB,, | Performed by: INTERNAL MEDICINE

## 2022-03-15 PROCEDURE — 3078F DIAST BP <80 MM HG: CPT | Mod: CPTII,S$GLB,, | Performed by: INTERNAL MEDICINE

## 2022-03-15 PROCEDURE — 1101F PR PT FALLS ASSESS DOC 0-1 FALLS W/OUT INJ PAST YR: ICD-10-PCS | Mod: CPTII,S$GLB,, | Performed by: INTERNAL MEDICINE

## 2022-03-15 PROCEDURE — 3288F FALL RISK ASSESSMENT DOCD: CPT | Mod: CPTII,S$GLB,, | Performed by: INTERNAL MEDICINE

## 2022-03-15 PROCEDURE — 1126F PR PAIN SEVERITY QUANTIFIED, NO PAIN PRESENT: ICD-10-PCS | Mod: CPTII,S$GLB,, | Performed by: INTERNAL MEDICINE

## 2022-03-15 PROCEDURE — 1101F PT FALLS ASSESS-DOCD LE1/YR: CPT | Mod: CPTII,S$GLB,, | Performed by: INTERNAL MEDICINE

## 2022-03-15 PROCEDURE — 3288F PR FALLS RISK ASSESSMENT DOCUMENTED: ICD-10-PCS | Mod: CPTII,S$GLB,, | Performed by: INTERNAL MEDICINE

## 2022-03-15 PROCEDURE — 1126F AMNT PAIN NOTED NONE PRSNT: CPT | Mod: CPTII,S$GLB,, | Performed by: INTERNAL MEDICINE

## 2022-03-15 PROCEDURE — 1159F PR MEDICATION LIST DOCUMENTED IN MEDICAL RECORD: ICD-10-PCS | Mod: CPTII,S$GLB,, | Performed by: INTERNAL MEDICINE

## 2022-03-15 PROCEDURE — 99215 OFFICE O/P EST HI 40 MIN: CPT | Mod: S$GLB,,, | Performed by: INTERNAL MEDICINE

## 2022-03-15 RX ORDER — COLESEVELAM 180 1/1
1250 TABLET ORAL 2 TIMES DAILY WITH MEALS
Qty: 360 TABLET | Refills: 3 | Status: SHIPPED | OUTPATIENT
Start: 2022-03-15 | End: 2022-05-06

## 2022-03-15 NOTE — ASSESSMENT & PLAN NOTE
Has tried fish oil in the past with little improvement   Triglycerides are elevated but are coming down on their own   She will re-trail OTC fish oil again

## 2022-03-15 NOTE — PROGRESS NOTES
Subjective:    Patient ID:  Sarina Zavala is a 76 y.o. female patient here for evaluation Hypertension, Hyperlipidemia, and Results (labs)      History of Present Illness:   Patient is here today for a follow up. She is under a lot of stress right now and so feels as if this causes her to have QUIJANO. She has had this same complaint int he past and underwent stress and echo last 03/2021 which was negative for RI and echo showed normal EF with grade I diastolic dysfunction. This complaint is unchanged.           Review of patient's allergies indicates:   Allergen Reactions    Sunflower oil Other (See Comments)    Povidone-iodine Itching    Soap Itching    Atorvastatin     Ezetimibe-simvastatin     Flagyl [metronidazole]      metallic taste     Hylan g-f 20      Other reaction(s): Shortness of breath  Other reaction(s): Shortness of breath    Latex, natural rubber     Lovastatin     Pitavastatin calcium     Pravastatin     Rosuvastatin     Sunflower seed     Latex Rash and Swelling    Naproxen Rash       Past Medical History:   Diagnosis Date    a Atypical Chest Pain With Normal LHC In 2015 ###    Dr. Daniel Christianson Is Her Cardiologist In [place]; 9/1/19 CT Ca+ Score = Was Reportedly Low    a Cardiac Diastolic Dysfunction     b Hypertension     c Hypercholesterolemia ###    3/14/22 Added WelChol 1,250 Mg BID With Labs In 6 Weeks; 2/24/22 RXd Nexlitol 180 Mg Daily; We Are NOT Going For Goal, Just Low Dose Statin; Dr. Daniel Christianson Has Said He Cannot RX Repatha Because She Has No CAD; She Has Had Myalgias With Crestor, Lipitor, And Zocor    c Mild Hypertriglyceridemia     f Obesity     f Osteoporosis     7/16/21 RXd Fosamax 70 Mg Weekly; 7/16/21 RXd OTC D3 2K IU Daily And CaCO3 1,200 Mg Daily    j GERD     Dr. URVASHI ingram H/O Cholecystectomy     j H/O Peptic Ulcer Disease     Dr. URVASHI ingram Irritable Bowel Syndrome ###    Dr. URVASHI yo  H/O Bilateral Reduction Mammoplasty     l Cervical Spinal DDD With H/O Disc Surgery In 1980     l Chronic Bilateral Wrist Pain     1/28/21 Referred To Dr. Gee Jernigan In [place]    l Gouty Arthropathy ###    l H/O Left Total Knee Replacement In 2008     m Chronic Fatigue ###    7/16/21 Vitamin B12 = Ordered; 4/21/21 TFTs = Normal    n Generalized Anxiety     11/18/19 (Phone Log) RXd Zoloft 25 Mg qHS, And Xanax 0.25 Mg Daily PRN    o Allergic Rhinosinusitis     Dr. Ramiro Vanegas    q Borderline Vitamin D Deficiency     3/14/22 RXd OTC D3 2K IU Daily/    Wellness Visit 2/24/2022      Past Surgical History:   Procedure Laterality Date    APPENDECTOMY      BREAST SURGERY      CERVICAL FUSION      CHOLECYSTECTOMY      HIP ARTHROPLASTY      HYSTERECTOMY      35 years old    KNEE SURGERY      TONSILLECTOMY      TOTAL REDUCTION MAMMOPLASTY Bilateral     2000     Social History     Tobacco Use    Smoking status: Never Smoker    Smokeless tobacco: Never Used   Substance Use Topics    Alcohol use: Not Currently    Drug use: No        Review of Systems:    As noted in HPI in addition      REVIEW OF SYSTEMS  CARDIOVASCULAR: No recent chest pain, palpitations, arm, neck, or jaw pain  RESPIRATORY: No recent fever, cough chills, SOB or congestion  : No blood in the urine  GI: No Nausea, vomiting, constipation, diarrhea, blood, or reflux.  MUSCULOSKELETAL: No myalgias  NEURO: No lightheadedness or dizziness  EYES: No Double vision, blurry, vision or headache              Objective        Vitals:    03/15/22 1623   BP: 130/70   Pulse: 77       LIPIDS - LAST 2   Lab Results   Component Value Date    CHOL 237 (H) 03/07/2022    CHOL 261 (H) 04/21/2021    HDL 47 03/07/2022    HDL 50 04/21/2021    LDLCALC 150.8 03/07/2022    LDLCALC 164.0 (H) 04/21/2021    TRIG 196 (H) 03/07/2022    TRIG 235 (H) 04/21/2021    CHOLHDL 19.8 (L) 03/07/2022    CHOLHDL 19.2 (L) 04/21/2021       CBC - LAST 2  Lab Results   Component  Value Date    WBC 5.97 03/07/2022    WBC 6.63 08/06/2020    RBC 4.52 03/07/2022    RBC 4.65 08/06/2020    HGB 13.3 03/07/2022    HGB 14.2 08/06/2020    HCT 41.9 03/07/2022    HCT 43.1 08/06/2020    MCV 93 03/07/2022    MCV 93 08/06/2020    MCH 29.4 03/07/2022    MCH 30.5 08/06/2020    MCHC 31.7 (L) 03/07/2022    MCHC 32.9 08/06/2020    RDW 13.0 03/07/2022    RDW 12.9 08/06/2020     03/07/2022     08/06/2020    MPV 9.6 03/07/2022    MPV 8.5 (L) 08/06/2020    GRAN 3.8 03/07/2022    GRAN 64.4 03/07/2022    LYMPH 1.4 03/07/2022    LYMPH 22.9 03/07/2022    MONO 0.6 03/07/2022    MONO 10.1 03/07/2022    BASO 0.03 03/07/2022    BASO 0.05 08/06/2020    NRBC 0 03/07/2022    NRBC 0 08/06/2020       CHEMISTRY & LIVER FUNCTION - LAST 2  Lab Results   Component Value Date     03/07/2022     04/21/2021    K 4.2 03/07/2022    K 4.3 04/21/2021     03/07/2022     04/21/2021    CO2 29 03/07/2022    CO2 31 (H) 04/21/2021    ANIONGAP 7 (L) 03/07/2022    ANIONGAP 7 (L) 04/21/2021    BUN 13 03/07/2022    BUN 21 04/21/2021    CREATININE 0.62 03/07/2022    CREATININE 0.6 04/21/2021    GLU 94 03/07/2022     04/21/2021    CALCIUM 9.3 03/07/2022    CALCIUM 9.6 04/21/2021    ALBUMIN 4.3 03/07/2022    ALBUMIN 4.2 04/21/2021    PROT 6.7 03/07/2022    PROT 7.0 04/21/2021    ALKPHOS 57 03/07/2022    ALKPHOS 48 (L) 04/21/2021    ALT 24 03/07/2022    ALT 27 04/21/2021    AST 30 03/07/2022    AST 23 04/21/2021    BILITOT 0.6 03/07/2022    BILITOT 0.9 04/21/2021        CARDIAC PROFILE - LAST 2  Lab Results   Component Value Date    CPK 81 03/07/2022     01/18/2013        COAGULATION - LAST 2  No results found for: LABPT, INR, APTT    ENDOCRINE & PSA - LAST 2  Lab Results   Component Value Date    HGBA1C 5.9 03/18/2010    HGBA1C 6.0 06/22/2009    TSH 1.430 03/07/2022    TSH 2.580 04/21/2021        ECHOCARDIOGRAM RESULTS  Results for orders placed during the hospital encounter of 03/01/21    Echo Color  Flow Doppler? Yes    Interpretation Summary  · The left ventricle is normal in size with concentric remodeling and normal systolic function. The estimated ejection fraction is 64%  · Grade I left ventricular diastolic dysfunction.  · Normal right ventricular size with normal right ventricular systolic function.  · Mild left atrial enlargement.  · Normal central venous pressure (3 mmHg).  · The estimated PA systolic pressure is 18 mmHg.      CURRENT/PREVIOUS VISIT EKG  Results for orders placed or performed in visit on 02/09/21   IN OFFICE EKG 12-LEAD (to [facility])    Collection Time: 02/09/21 10:27 AM    Narrative    Test Reason : R07.89,    Vent. Rate : 093 BPM     Atrial Rate : 093 BPM     P-R Int : 158 ms          QRS Dur : 082 ms      QT Int : 372 ms       P-R-T Axes : 067 -10 043 degrees     QTc Int : 462 ms    Normal sinus rhythm  Normal ECG  No previous ECGs available  Confirmed by Basilio MAHER, Alexander ANGEL ([id number]) on 2/18/2021 8:00:20 AM    Referred By: [email or web address]   SELF           Confirmed By:Alexander Richmond MD     No valid procedures specified.   Results for orders placed during the hospital encounter of 03/01/21    Nuclear Stress - Cardiology Interpreted    Interpretation Summary    Normal myocardial perfusion scan. There is no evidence of myocardial ischemia or infarction.    The gated perfusion images showed an ejection fraction of 70% post stress. Normal ejection fraction is greater than 53%.    The EKG portion of this study is negative for ischemia.    The patient reported chest pain during the stress test.    No valid procedures specified.    PHYSICAL EXAM  CONSTITUTIONAL: Well built, well nourished in no apparent distress  NECK: no carotid bruit, no JVD  LUNGS: CTA  CHEST WALL: no tenderness  HEART: regular rate and rhythm, S1, S2 normal, no murmur, click, rub or gallop   ABDOMEN: soft, non-tender; bowel sounds normal; no masses,  no organomegaly  EXTREMITIES: Extremities normal, no edema, no calf  tenderness noted  NEURO: AAO X 3    I HAVE REVIEWED :    The vital signs, nurses notes, and all the pertinent radiology and labs.        Current Outpatient Medications   Medication Instructions    alendronate (FOSAMAX) 70 mg, Oral, Every 7 days    ascorbic acid (vitamin C) (VITAMIN C) 500 mg, Oral, Daily    aspirin (ECOTRIN) 81 MG EC tablet Every day    azelastine (ASTELIN) 274 mcg, Nasal, 2 times daily PRN    bempedoic acid 180 mg, Oral, Daily    calcium carbonate (OS-VERONIKA) 1,200 mg, Oral, Daily    carvediloL (COREG) 12.5 mg, Oral, 2 times daily with meals    cholecalciferol (vitamin D3) (VITAMIN D3) 2,000 Units, Oral, Daily    coenzyme Q10 400 mg, Oral, Daily    colesevelam (WELCHOL) 1,250 mg, Oral, 2 times daily with meals    levocetirizine (XYZAL) 5 mg, Oral, Daily    losartan (COZAAR) 25 mg, Oral, Daily    montelukast (SINGULAIR) 10 mg, Oral, Nightly, Every day    multivitamin-minerals-lutein Tab Every day    pantoprazole (PROTONIX) 40 MG tablet TAKE 1 TABLET BY MOUTH EVERY DAY    zinc gluconate 50 mg, Oral, Daily          Assessment & Plan     Borderline Vitamin D Deficiency  She can go back to taking 600 IU daily as this is what she was taking beginning of covid and her numbers have dropped a bit since getting off of it     Hypercholesterolemia  Insurance will not approve nexletol so she has not been taking it   PCP has told her to start taking welchol, but she has not started it yet   Encouraged to start welchol now   Low fat low cholesterol diet   Also recommend OTC fish oil     Cardiac Diastolic Dysfunction  Stable       Hypertension  /70   Cont losartan  Low Na diet     Mild Hypertriglyceridemia  Has tried fish oil in the past with little improvement   Triglycerides are elevated but are coming down on their own   She will re-trail OTC fish oil again     Right carotid bruit  Not previously documented   Obtain BL carotid US           Follow up in about 6 months (around 9/15/2022).

## 2022-03-15 NOTE — ASSESSMENT & PLAN NOTE
She can go back to taking 600 IU daily as this is what she was taking beginning of covid and her numbers have dropped a bit since getting off of it

## 2022-03-15 NOTE — ASSESSMENT & PLAN NOTE
Insurance will not approve nexletol so she has not been taking it   PCP has told her to start taking welchol, but she has not started it yet   Encouraged to start welchol now   Low fat low cholesterol diet   Also recommend OTC fish oil

## 2022-03-16 RX ORDER — CARVEDILOL 12.5 MG/1
12.5 TABLET ORAL 2 TIMES DAILY WITH MEALS
Qty: 60 TABLET | Refills: 11 | Status: SHIPPED | OUTPATIENT
Start: 2022-03-16 | End: 2022-03-16

## 2022-03-16 NOTE — TELEPHONE ENCOUNTER
----- Message from Sammy Yuen sent at 3/16/2022 11:44 AM CDT -----  Contact: pt  Pt is changing Pharmacy to [facility] sending Rx request for pt       Type: Rx Refill Request    Who Called:pt  Refill or New Rx:Refill  Rx Name and Strength:    carvediloL (COREG) 12.5 MG tablet    How is the patient currently taking it?(ex.1xday): As Directed  Is this a 30 day or 90 day Rx: As Directed  Preferred Pharmacy with Phone Number:    [id number] - [place], [place] - [address] [address]  [address] [address] [address]  Phone: [phone] Fax: [phone]

## 2022-04-01 ENCOUNTER — TELEPHONE (OUTPATIENT)
Dept: CARDIOLOGY | Facility: CLINIC | Age: 77
End: 2022-04-01
Payer: MEDICARE

## 2022-04-01 NOTE — TELEPHONE ENCOUNTER
----- Message from Sara Mejia PA-C sent at 4/1/2022  9:07 AM CDT -----  Carotid US looks good, no need for any intervention

## 2022-05-06 PROBLEM — M51.369 DDD (DEGENERATIVE DISC DISEASE), LUMBAR: Status: ACTIVE | Noted: 2020-05-06

## 2022-05-06 PROBLEM — M51.36 DDD (DEGENERATIVE DISC DISEASE), LUMBAR: Status: ACTIVE | Noted: 2020-05-06

## 2022-08-25 PROBLEM — M25.551 RIGHT HIP PAIN: Status: ACTIVE | Noted: 2022-08-25

## 2022-08-25 PROBLEM — J30.2 SEASONAL ALLERGIES: Status: ACTIVE | Noted: 2022-08-25

## 2022-08-25 PROBLEM — M41.87: Status: ACTIVE | Noted: 2022-08-25

## 2023-03-02 DIAGNOSIS — Z13.820 SPECIAL SCREENING FOR OSTEOPOROSIS: Primary | ICD-10-CM

## 2023-03-02 DIAGNOSIS — M81.0 SENILE OSTEOPOROSIS: ICD-10-CM

## 2023-03-27 DIAGNOSIS — Z12.31 ENCOUNTER FOR SCREENING MAMMOGRAM FOR MALIGNANT NEOPLASM OF BREAST: Primary | ICD-10-CM

## 2023-04-06 ENCOUNTER — HOSPITAL ENCOUNTER (OUTPATIENT)
Dept: RADIOLOGY | Facility: HOSPITAL | Age: 78
Discharge: HOME | End: 2023-04-06
Attending: SPECIALIST
Payer: MEDICARE

## 2023-04-06 VITALS — BODY MASS INDEX: 32.74 KG/M2 | WEIGHT: 191.81 LBS | HEIGHT: 64 IN

## 2023-04-06 DIAGNOSIS — Z12.31 ENCOUNTER FOR SCREENING MAMMOGRAM FOR MALIGNANT NEOPLASM OF BREAST: ICD-10-CM

## 2023-04-06 DIAGNOSIS — M81.0 SENILE OSTEOPOROSIS: ICD-10-CM

## 2023-04-06 DIAGNOSIS — Z13.820 SPECIAL SCREENING FOR OSTEOPOROSIS: ICD-10-CM

## 2023-04-06 PROCEDURE — 77080 DXA BONE DENSITY AXIAL: CPT | Mod: TC,PO

## 2023-04-06 PROCEDURE — 77067 SCR MAMMO BI INCL CAD: CPT | Mod: TC,PO

## 2023-05-26 ENCOUNTER — TELEPHONE (OUTPATIENT)
Dept: PHARMACY | Facility: CLINIC | Age: 78
End: 2023-05-26
Payer: MEDICARE

## 2023-05-26 NOTE — TELEPHONE ENCOUNTER
Jessenia, this is Nikki Bloom, clinical pharmacist with [facility] that is part of your care team.  We have begun working on your prescription that your doctor has sent us. Our next steps include:     Working with your insurance company to obtain approval for your medication  Working with you to ensure your medication is affordable     We will be calling you along the way with updates on your medication but if you have any concerns or receive information that you would like to discuss please reach us at [phone].    Welcome call outcome: Patient/caregiver reached

## 2023-05-29 ENCOUNTER — SPECIALTY PHARMACY (OUTPATIENT)
Dept: PHARMACY | Facility: CLINIC | Age: 78
End: 2023-05-29
Payer: MEDICARE

## 2023-06-05 ENCOUNTER — SPECIALTY PHARMACY (OUTPATIENT)
Dept: PHARMACY | Facility: CLINIC | Age: 78
End: 2023-06-05
Payer: MEDICARE

## 2023-06-05 NOTE — TELEPHONE ENCOUNTER
Specialty Pharmacy - Initial Clinical Assessment    Specialty Medication Orders Linked to Encounter      Flowsheet Row Most Recent Value   Medication #1 evolocumab (REPATHA SURECLICK) 140 mg/mL PnIj (Order#[id number], Rx#[id number])          Patient Diagnosis   E78.5 - Hyperlipidemia    Subjective    Sarina Zavala is a 77 y.o. female, who is followed by the specialty pharmacy service for management and education.    Recent Encounters       Date Type Provider Description    06/05/2023 Specialty Pharmacy Nikki Bloom PharmD Initial Clinical Assessment    05/29/2023 Specialty Pharmacy Nikki Bloom PharmD Referral Authorization            Current Outpatient Medications   Medication Sig    alendronate (FOSAMAX) 35 MG tablet Take 1 tablet (35 mg total) by mouth every 7 days.    allopurinoL (ZYLOPRIM) 100 MG tablet Take 1 tablet (100 mg total) by mouth once daily.    aspirin (ECOTRIN) 81 MG EC tablet Every day    calcium carbonate (OS-VERONIKA) 600 mg calcium (1,500 mg) Tab Take 2 tablets (1,200 mg total) by mouth once daily.    carvediloL (COREG) 12.5 MG tablet Take 1 tablet (12.5 mg total) by mouth 2 (two) times daily with meals.    cholecalciferol, vitamin D3, (VITAMIN D3) 50 mcg (2,000 unit) Tab Take 1 tablet (2,000 Units total) by mouth once daily.    colesevelam (WELCHOL) 625 mg tablet Take 1,875 mg by mouth 2 (two) times daily with meals.    evolocumab (REPATHA SURECLICK) 140 mg/mL PnIj Inject 1 mL (140 mg total) into the skin every 14 (fourteen) days.    losartan (COZAAR) 25 MG tablet Take 1 tablet (25 mg total) by mouth once daily.    montelukast (SINGULAIR) 10 mg tablet Take 1 tablet (10 mg total) by mouth every evening. Every day    pantoprazole (PROTONIX) 40 MG tablet TAKE 1 TABLET BY MOUTH EVERY DAY    sertraline (ZOLOFT) 25 MG tablet Take 1 tablet (25 mg total) by mouth nightly.   Last reviewed on 8/25/2022  5:00 PM by CHARI Wynn    Review of patient's allergies indicates:   Allergen  Reactions    Sunflower oil Other (See Comments)    Soap Itching    Atorvastatin     Ezetimibe-simvastatin     Flagyl [metronidazole]      metallic taste     Hylan g-f 20      Other reaction(s): Shortness of breath  Other reaction(s): Shortness of breath    Latex, natural rubber     Lovastatin     Pitavastatin calcium     Pravastatin     Rosuvastatin     Sunflower seed     Latex Rash and Swelling    Naproxen Rash    Povidone-iodine Itching     topical   Last reviewed on  5/11/2023 3:15 PM by Dottie Kim          Assessment Questions - Documented Responses      Flowsheet Row Most Recent Value   Assessment    Medication Reconciliation completed for patient No   Goals of Therapy Status Discussed (new start)   Status of the patients ability to self-administer: Is Able   All education points have been covered with patient? Yes, supplemental printed education provided   Welcome packet contents reviewed and discussed with patient? Yes   Assesment completed? Yes   Plan Therapy being initiated   Do you need to open a clinical intervention (i-vent)? No   Do you want to schedule first shipment? Yes   Medication #1 Assessment Info    Patient status New medication   Is this medication appropriate for the patient? Yes   Is this medication effective? Not yet started          Refill Questions - Documented Responses      Flowsheet Row Most Recent Value   Refill Screening Questions    When does the patient need to receive the medication? 06/07/23   Refill Delivery Questions    How will the patient receive the medication? MEDRx   When does the patient need to receive the medication? 06/07/23   Shipping Address Home   Address in [facility] confirmed and updated if neccessary? Yes   Expected Copay ($) 40   Is the patient able to afford the medication copay? Yes   Payment Method new CC added to file   Days supply of Refill 28   Supplies needed? No supplies needed   Refill activity completed? Yes   Refill activity plan Refill  "scheduled   Shipment/Pickup Date: 06/06/23            Objective    She has a past medical history of a Atypical Chest Pain With Normal LHC In 2015 (###), a Cardiac Diastolic Dysfunction, b Hypertension, c Hypercholesterolemia (###), c Mild Hypertriglyceridemia, f Obesity, f Osteoporosis, j GERD, j H/O Cholecystectomy, j H/O Peptic Ulcer Disease, j Irritable Bowel Syndrome (###), k H/O Bilateral Reduction Mammoplasty, l Cervical Spinal DDD With H/O Disc Surgery In 1980, l Chronic Bilateral Wrist Pain, l Gouty Arthropathy (###), l H/O Left Total Knee Replacement In 2008, Levoscoliosis of lumbosacral spine (8/25/2022), m Chronic Fatigue (###), n Generalized Anxiety, o Allergic Rhinosinusitis, q Borderline Vitamin D Deficiency, and Wellness Visit 2/24/2022.      BP Readings from Last 4 Encounters:   04/14/23 128/88   08/25/22 128/64   06/23/22 110/60   06/17/22 (!) 120/57     Ht Readings from Last 4 Encounters:   05/18/23 5' 4" (1.626 m)   04/14/23 5' 4" (1.626 m)   04/06/23 5' 4" (1.626 m)   06/17/22 5' 4" (1.626 m)     Wt Readings from Last 4 Encounters:   05/18/23 86.6 kg (191 lb)   04/14/23 86.6 kg (191 lb)   04/06/23 87 kg (191 lb 12.8 oz)   08/25/22 87 kg (191 lb 12.8 oz)       The goals of prescribed drug therapy management include:  Supporting patient to meet the prescriber's medical treatment objectives  Improving or maintaining quality of life  Maintaining optimal therapy adherence  Minimizing and managing side effects      Goals of Therapy Status: Discussed (new start)    Assessment/Plan  Patient plans to start therapy on 06/07/23      Indication, dosage, appropriateness, effectiveness, safety and convenience of her specialty medication(s) were reviewed today.     Patient Education   Patient received education on the following:   Expectations and possible outcomes of therapy  Proper use, timely administration, and missed dose management  Duration of therapy  Side effects, including prevention, minimization, " and management  Contraindications and safety precautions  New or changed medications, including prescribe and over the counter medications and supplements  Reviews recommended vaccinations, as appropriate  Storage, safe handling, and disposal        Tasks added this encounter   No tasks added.   Tasks due within next 3 months   6/5/2023 - Initial Clinical Assessment/Patient Education (1 Time Occurence)  5/29/2023 - Set up Initial Fill     Regine OrtegaD  [facility]  [address]  [facility] [address]  Phone: [phone]  Fax: [phone]

## 2023-06-26 ENCOUNTER — SPECIALTY PHARMACY (OUTPATIENT)
Dept: PHARMACY | Facility: CLINIC | Age: 78
End: 2023-06-26
Payer: MEDICARE

## 2023-06-26 NOTE — TELEPHONE ENCOUNTER
Incoming call from pt stating she spoke to someone at [facility] regarding a misfire, was given number to  to request for replacement. Pt stated  will be sending her one pen. Inquired if she should inject or wait. Pt is unsure if received any medication on the misfire due 6/24.     Per package insert: if missed dose is within 7 days, can inject as soon as received and resume original schedule. If more than 7 days after missed dose, wait until the next dose on the original schedule    Informed pt of package insert info above, pt is unsure if she received any doses from misfire injection, afraid of overdosing if injects as soon as she receives. Advised pt to just wait until 7/8 to inject. Pt verbalized understanding, if pt have any more concerns / questions, please contact [facility] once received.

## 2023-06-30 NOTE — TELEPHONE ENCOUNTER
Specialty Pharmacy - Clinical Intervention  Specialty Pharmacy - Refill Coordination    Specialty Medication Orders Linked to Encounter      Flowsheet Row Most Recent Value   Medication #1 evolocumab (REPATHA SURECLICK) 140 mg/mL PnIj (Order#[id number], Rx#[id number])        Pt stated she just received the box to send her misfired pen back in on 6/30. Set up refill to ensure pt will not miss her dose 7/8. Informed pt she can still get her replacement as well.     Refill Questions - Documented Responses      Flowsheet Row Most Recent Value   Patient Availability and HIPAA Verification    Does patient want to proceed with activity? Yes   HIPAA/medical authority confirmed? Yes   Relationship to patient of person spoken to? Self   Refill Screening Questions    Would patient like to speak to a pharmacist? No   When does the patient need to receive the medication? 07/08/23   Refill Delivery Questions    How will the patient receive the medication? MEDRx   When does the patient need to receive the medication? 07/08/23   Shipping Address Home   Address in [facility] confirmed and updated if neccessary? Yes   Expected Copay ($) 40   Is the patient able to afford the medication copay? Yes   Payment Method CC on file   Days supply of Refill 28   Supplies needed? No supplies needed   Refill activity completed? Yes   Refill activity plan Refill scheduled   Shipment/Pickup Date: 07/05/23            Current Outpatient Medications   Medication Sig    alendronate (FOSAMAX) 35 MG tablet Take 1 tablet (35 mg total) by mouth every 7 days.    allopurinoL (ZYLOPRIM) 100 MG tablet Take 1 tablet (100 mg total) by mouth once daily.    aspirin (ECOTRIN) 81 MG EC tablet Every day    calcium carbonate (OS-VERONIKA) 600 mg calcium (1,500 mg) Tab Take 2 tablets (1,200 mg total) by mouth once daily.    carvediloL (COREG) 12.5 MG tablet Take 1 tablet (12.5 mg total) by mouth 2 (two) times daily with meals.    cholecalciferol, vitamin D3,  (VITAMIN D3) 50 mcg (2,000 unit) Tab Take 1 tablet (2,000 Units total) by mouth once daily.    colesevelam (WELCHOL) 625 mg tablet Take 1,875 mg by mouth 2 (two) times daily with meals.    evolocumab (REPATHA SURECLICK) 140 mg/mL PnIj Inject 1 mL (140 mg total) into the skin every 14 (fourteen) days.    losartan (COZAAR) 25 MG tablet Take 1 tablet (25 mg total) by mouth once daily.    montelukast (SINGULAIR) 10 mg tablet Take 1 tablet (10 mg total) by mouth every evening. Every day    pantoprazole (PROTONIX) 40 MG tablet TAKE 1 TABLET BY MOUTH EVERY DAY    sertraline (ZOLOFT) 25 MG tablet Take 1 tablet (25 mg total) by mouth nightly.   Last reviewed on 8/25/2022  5:00 PM by CHARI Wynn    Review of patient's allergies indicates:   Allergen Reactions    Sunflower oil Other (See Comments)    Soap Itching    Atorvastatin     Ezetimibe-simvastatin     Flagyl [metronidazole]      metallic taste     Hylan g-f 20      Other reaction(s): Shortness of breath  Other reaction(s): Shortness of breath    Latex, natural rubber     Lovastatin     Pitavastatin calcium     Pravastatin     Rosuvastatin     Sunflower seed     Latex Rash and Swelling    Naproxen Rash    Povidone-iodine Itching     topical    Last reviewed on  5/11/2023 3:15 PM by Dottie Kim      Tasks added this encounter   No tasks added.   Tasks due within next 3 months   No tasks due.     Maranda Oglesby, Patient Care Assistant  [facility]  [address] [place], [place] [address]  [facility] [address]  Phone: [phone]  Fax: [phone]

## 2023-07-26 ENCOUNTER — SPECIALTY PHARMACY (OUTPATIENT)
Dept: PHARMACY | Facility: CLINIC | Age: 78
End: 2023-07-26
Payer: MEDICARE

## 2023-07-26 PROBLEM — M17.11 OSTEOARTHRITIS OF RIGHT KNEE: Status: ACTIVE | Noted: 2023-07-26

## 2023-07-26 PROBLEM — M41.87: Status: ACTIVE | Noted: 2023-07-26

## 2023-07-26 PROBLEM — M41.87: Status: RESOLVED | Noted: 2022-08-25 | Resolved: 2023-07-26

## 2023-07-26 NOTE — TELEPHONE ENCOUNTER
Specialty Pharmacy - Refill Coordination    Specialty Medication Orders Linked to Encounter      Flowsheet Row Most Recent Value   Medication #1 evolocumab (REPATHA SURECLICK) 140 mg/mL PnIj (Order#[id number], Rx#[id number])            Refill Questions - Documented Responses      Flowsheet Row Most Recent Value   Patient Availability and HIPAA Verification    Does patient want to proceed with activity? Yes   HIPAA/medical authority confirmed? Yes   Relationship to patient of person spoken to? Self   Refill Screening Questions    Would patient like to speak to a pharmacist? No   When does the patient need to receive the medication? 08/01/23   Refill Delivery Questions    How will the patient receive the medication? MEDRx   When does the patient need to receive the medication? 08/01/23   Shipping Address Home   Address in [facility] confirmed and updated if neccessary? Yes   Expected Copay ($) 40   Is the patient able to afford the medication copay? Yes   Payment Method CC on file   Days supply of Refill 28   Refill activity completed? Yes   Refill activity plan Refill scheduled   Shipment/Pickup Date: 07/27/23            Current Outpatient Medications   Medication Sig    alendronate (FOSAMAX) 35 MG tablet Take 1 tablet (35 mg total) by mouth every 7 days.    allopurinoL (ZYLOPRIM) 100 MG tablet Take 1 tablet (100 mg total) by mouth once daily.    aspirin (ECOTRIN) 81 MG EC tablet Every day    calcium carbonate (OS-VERONIKA) 600 mg calcium (1,500 mg) Tab Take 2 tablets (1,200 mg total) by mouth once daily.    carvediloL (COREG) 12.5 MG tablet Take 1 tablet (12.5 mg total) by mouth 2 (two) times daily with meals.    cholecalciferol, vitamin D3, (VITAMIN D3) 50 mcg (2,000 unit) Tab Take 1 tablet (2,000 Units total) by mouth once daily.    colesevelam (WELCHOL) 625 mg tablet Take 1,875 mg by mouth 2 (two) times daily with meals.    evolocumab (REPATHA SURECLICK) 140 mg/mL PnIj Inject 1 mL (140 mg total) into the skin  every 14 (fourteen) days.    losartan (COZAAR) 25 MG tablet Take 1 tablet (25 mg total) by mouth once daily.    montelukast (SINGULAIR) 10 mg tablet Take 1 tablet (10 mg total) by mouth every evening. Every day    pantoprazole (PROTONIX) 40 MG tablet TAKE 1 TABLET BY MOUTH EVERY DAY    sertraline (ZOLOFT) 25 MG tablet Take 1 tablet (25 mg total) by mouth nightly.   Last reviewed on 8/25/2022  5:00 PM by CHARI Wynn    Review of patient's allergies indicates:   Allergen Reactions    Sunflower oil Other (See Comments)    Soap Itching    Atorvastatin     Ezetimibe-simvastatin     Flagyl [metronidazole]      metallic taste     Hylan g-f 20      Other reaction(s): Shortness of breath  Other reaction(s): Shortness of breath    Latex, natural rubber     Lovastatin     Pitavastatin calcium     Pravastatin     Rosuvastatin     Sunflower seed     Latex Rash and Swelling    Naproxen Rash    Povidone-iodine Itching     topical    Last reviewed on  5/11/2023 3:15 PM by Dottie Kim      Tasks added this encounter   No tasks added.   Tasks due within next 3 months   No tasks due.     [facility]  [address] [facility] [address]  Phone: [phone]  Fax: [phone]

## 2023-08-17 ENCOUNTER — SPECIALTY PHARMACY (OUTPATIENT)
Dept: PHARMACY | Facility: CLINIC | Age: 78
End: 2023-08-17

## 2023-08-17 NOTE — TELEPHONE ENCOUNTER
Outgoing call- Repatha refill   Pt next injection is due on 8/2. Informed pt that refill is too soon and will follow up on 7/23.

## 2023-08-18 NOTE — TELEPHONE ENCOUNTER
Incoming call from patient. Reports she has surgery and was instructed to hold until cleared my surgeon. Next appt with provider 8/29. She does have 1 syringe on hand for when she is cleared to restart prescription. Will route to assigned Rph to pend follow-up when appropriate.

## 2023-08-30 NOTE — TELEPHONE ENCOUNTER
Patient will restart injections on 9/13 with her 1 pen on hand. Will pend call to 9/20 for refill.

## 2023-09-20 NOTE — TELEPHONE ENCOUNTER
Outgoing call to patient for Repatha refill. Patient stated she was instructed to hold Repatha by surgeon and will call him today to figure out if she should resume therapy.

## 2023-10-13 PROBLEM — M17.11 OSTEOARTHRITIS OF RIGHT KNEE: Status: RESOLVED | Noted: 2023-07-26 | Resolved: 2023-10-13

## 2023-10-13 PROBLEM — Z96.651 HISTORY OF TOTAL KNEE REPLACEMENT, RIGHT: Status: ACTIVE | Noted: 2023-10-13

## 2023-11-29 PROBLEM — R73.9 HYPERGLYCEMIA: Status: ACTIVE | Noted: 2023-11-29

## 2023-11-29 PROBLEM — I65.23 BILATERAL CAROTID ARTERY STENOSIS: Status: ACTIVE | Noted: 2023-11-29

## 2023-11-29 PROBLEM — Z00.00 PREVENTATIVE HEALTH CARE: Status: RESOLVED | Noted: 2023-11-29 | Resolved: 2023-11-29

## 2023-11-29 PROBLEM — Z00.00 PREVENTATIVE HEALTH CARE: Status: ACTIVE | Noted: 2023-11-29

## 2024-09-16 PROBLEM — M47.816 SPONDYLOSIS OF LUMBAR SPINE: Status: ACTIVE | Noted: 2024-09-16

## 2024-09-23 PROBLEM — G89.29 CHRONIC BILATERAL LOW BACK PAIN WITHOUT SCIATICA: Status: ACTIVE | Noted: 2024-09-23

## 2024-09-23 PROBLEM — M54.50 CHRONIC BILATERAL LOW BACK PAIN WITHOUT SCIATICA: Status: ACTIVE | Noted: 2024-09-23

## 2025-01-16 ENCOUNTER — OFFICE VISIT (OUTPATIENT)
Dept: PODIATRY | Facility: CLINIC | Age: 80
End: 2025-01-16
Payer: MEDICARE

## 2025-01-16 VITALS — BODY MASS INDEX: 33.27 KG/M2 | HEIGHT: 64 IN | WEIGHT: 194.88 LBS

## 2025-01-16 DIAGNOSIS — M72.2 PLANTAR FASCIITIS: Primary | ICD-10-CM

## 2025-01-16 DIAGNOSIS — M62.462 GASTROCNEMIUS EQUINUS OF LEFT LOWER EXTREMITY: ICD-10-CM

## 2025-01-16 DIAGNOSIS — M10.071 ACUTE IDIOPATHIC GOUT OF RIGHT FOOT: ICD-10-CM

## 2025-01-16 PROCEDURE — 1101F PT FALLS ASSESS-DOCD LE1/YR: CPT | Mod: CPTII,S$GLB,, | Performed by: PODIATRIST

## 2025-01-16 PROCEDURE — 1125F AMNT PAIN NOTED PAIN PRSNT: CPT | Mod: CPTII,S$GLB,, | Performed by: PODIATRIST

## 2025-01-16 PROCEDURE — 99203 OFFICE O/P NEW LOW 30 MIN: CPT | Mod: S$GLB,,, | Performed by: PODIATRIST

## 2025-01-16 PROCEDURE — 3288F FALL RISK ASSESSMENT DOCD: CPT | Mod: CPTII,S$GLB,, | Performed by: PODIATRIST

## 2025-01-16 PROCEDURE — 1159F MED LIST DOCD IN RCRD: CPT | Mod: CPTII,S$GLB,, | Performed by: PODIATRIST

## 2025-01-16 PROCEDURE — 99999 PR PBB SHADOW E&M-EST. PATIENT-LVL IV: CPT | Mod: PBBFAC,,, | Performed by: PODIATRIST

## 2025-01-16 NOTE — PATIENT INSTRUCTIONS
- Perform stretching exercises, see handout for details, to address tightness of calf muscles.      - Recommend wearing supportive shoes only.  Avoid barefoot walking, flip flops, and Crocs, as this will exacerbate current symptoms.      - Julius (Billy Mata, challenger)    - Recommend icing the affected heel a minimum of 20 minutes daily.    - Avoid high impact activities such as squatting, stooping, and running as these activities will exacerbate symptoms.      - May consider applying a topical analgesic (Voltaren cream) to help with pain symptoms.         Gout: Recommend taking 1 tart cherry capsule daily.    Recommend a heel counter pad to obtain a better fit with wearing larger shoes.    Recommend applying ebanel lotion to the nails and adjacent skin daily to prevent ingrowing of the nails.